# Patient Record
Sex: MALE | Race: ASIAN | Employment: FULL TIME | URBAN - NONMETROPOLITAN AREA
[De-identification: names, ages, dates, MRNs, and addresses within clinical notes are randomized per-mention and may not be internally consistent; named-entity substitution may affect disease eponyms.]

---

## 2020-08-29 ENCOUNTER — HOSPITAL ENCOUNTER (EMERGENCY)
Age: 50
Discharge: HOME | End: 2020-08-29
Payer: COMMERCIAL

## 2020-08-29 ENCOUNTER — APPOINTMENT (OUTPATIENT)
Dept: CT IMAGING | Age: 50
End: 2020-08-29
Payer: COMMERCIAL

## 2020-08-29 VITALS
BODY MASS INDEX: 25.34 KG/M2 | OXYGEN SATURATION: 98 % | WEIGHT: 143 LBS | RESPIRATION RATE: 20 BRPM | TEMPERATURE: 98 F | SYSTOLIC BLOOD PRESSURE: 148 MMHG | HEART RATE: 78 BPM | DIASTOLIC BLOOD PRESSURE: 78 MMHG | HEIGHT: 63 IN

## 2020-08-29 LAB
ALBUMIN SERPL-MCNC: 4.5 G/DL (ref 3.5–5.2)
ALP BLD-CCNC: 59 U/L (ref 40–130)
ALT SERPL-CCNC: 32 U/L (ref 5–41)
ANION GAP SERPL CALCULATED.3IONS-SCNC: 9 MMOL/L (ref 7–19)
AST SERPL-CCNC: 24 U/L (ref 5–40)
BASOPHILS ABSOLUTE: 0.1 K/UL (ref 0–0.2)
BASOPHILS RELATIVE PERCENT: 1 % (ref 0–1)
BILIRUB SERPL-MCNC: <0.2 MG/DL (ref 0.2–1.2)
BUN BLDV-MCNC: 14 MG/DL (ref 6–20)
CALCIUM SERPL-MCNC: 8.9 MG/DL (ref 8.6–10)
CHLORIDE BLD-SCNC: 105 MMOL/L (ref 98–111)
CHP ED QC CHECK: NORMAL
CO2: 27 MMOL/L (ref 22–29)
CREAT SERPL-MCNC: 0.8 MG/DL (ref 0.5–1.2)
EOSINOPHILS ABSOLUTE: 0.5 K/UL (ref 0–0.6)
EOSINOPHILS RELATIVE PERCENT: 5.2 % (ref 0–5)
GFR AFRICAN AMERICAN: >59
GFR NON-AFRICAN AMERICAN: >60
GLUCOSE BLD-MCNC: 65 MG/DL (ref 70–99)
GLUCOSE BLD-MCNC: 99 MG/DL (ref 74–109)
HCT VFR BLD CALC: 46.3 % (ref 42–52)
HEMOGLOBIN: 15.3 G/DL (ref 14–18)
IMMATURE GRANULOCYTES #: 0.1 K/UL
LYMPHOCYTES ABSOLUTE: 3.3 K/UL (ref 1.1–4.5)
LYMPHOCYTES RELATIVE PERCENT: 34.5 % (ref 20–40)
MCH RBC QN AUTO: 30.7 PG (ref 27–31)
MCHC RBC AUTO-ENTMCNC: 33 G/DL (ref 33–37)
MCV RBC AUTO: 92.8 FL (ref 80–94)
MONOCYTES ABSOLUTE: 0.9 K/UL (ref 0–0.9)
MONOCYTES RELATIVE PERCENT: 9.5 % (ref 0–10)
NEUTROPHILS ABSOLUTE: 4.6 K/UL (ref 1.5–7.5)
NEUTROPHILS RELATIVE PERCENT: 48.9 % (ref 50–65)
PDW BLD-RTO: 12.1 % (ref 11.5–14.5)
PERFORMED ON: ABNORMAL
PLATELET # BLD: 212 K/UL (ref 130–400)
PMV BLD AUTO: 11.4 FL (ref 9.4–12.4)
POTASSIUM SERPL-SCNC: 4.3 MMOL/L (ref 3.5–5)
RBC # BLD: 4.99 M/UL (ref 4.7–6.1)
SODIUM BLD-SCNC: 141 MMOL/L (ref 136–145)
TOTAL PROTEIN: 7.2 G/DL (ref 6.6–8.7)
WBC # BLD: 9.4 K/UL (ref 4.8–10.8)

## 2020-08-29 PROCEDURE — 82947 ASSAY GLUCOSE BLOOD QUANT: CPT

## 2020-08-29 PROCEDURE — 2580000003 HC RX 258: Performed by: NURSE PRACTITIONER

## 2020-08-29 PROCEDURE — 70450 CT HEAD/BRAIN W/O DYE: CPT

## 2020-08-29 PROCEDURE — 80053 COMPREHEN METABOLIC PANEL: CPT

## 2020-08-29 PROCEDURE — 99999 PR OFFICE/OUTPT VISIT,PROCEDURE ONLY: CPT | Performed by: NURSE PRACTITIONER

## 2020-08-29 PROCEDURE — 93005 ELECTROCARDIOGRAM TRACING: CPT | Performed by: NURSE PRACTITIONER

## 2020-08-29 PROCEDURE — 85025 COMPLETE CBC W/AUTO DIFF WBC: CPT

## 2020-08-29 PROCEDURE — 99284 EMERGENCY DEPT VISIT MOD MDM: CPT

## 2020-08-29 PROCEDURE — 36415 COLL VENOUS BLD VENIPUNCTURE: CPT

## 2020-08-29 RX ORDER — LOSARTAN POTASSIUM 25 MG/1
25 TABLET ORAL DAILY
COMMUNITY

## 2020-08-29 RX ORDER — GEMFIBROZIL 600 MG/1
600 TABLET, FILM COATED ORAL
COMMUNITY

## 2020-08-29 RX ORDER — ROSUVASTATIN CALCIUM 10 MG/1
10 TABLET, COATED ORAL DAILY
COMMUNITY

## 2020-08-29 RX ORDER — SODIUM CHLORIDE, SODIUM LACTATE, POTASSIUM CHLORIDE, CALCIUM CHLORIDE 600; 310; 30; 20 MG/100ML; MG/100ML; MG/100ML; MG/100ML
1000 INJECTION, SOLUTION INTRAVENOUS ONCE
Status: COMPLETED | OUTPATIENT
Start: 2020-08-29 | End: 2020-08-29

## 2020-08-29 RX ADMIN — SODIUM CHLORIDE, POTASSIUM CHLORIDE, SODIUM LACTATE AND CALCIUM CHLORIDE 1000 ML: 600; 310; 30; 20 INJECTION, SOLUTION INTRAVENOUS at 19:18

## 2020-08-29 ASSESSMENT — ENCOUNTER SYMPTOMS
RHINORRHEA: 0
VOMITING: 0
ABDOMINAL PAIN: 0
TROUBLE SWALLOWING: 0
CONSTIPATION: 0
DIARRHEA: 0
COUGH: 0
NAUSEA: 0
SHORTNESS OF BREATH: 0
SORE THROAT: 0

## 2020-08-29 NOTE — ED NOTES
Patient placed in a gown. Patient placed on cardiac monitor, continuous pulse oximeter, and NIBP monitor.  Monitor alarms on.       Nia Medina RN  08/29/20 0199

## 2020-08-30 NOTE — ED PROVIDER NOTES
nervous/anxious. A complete review of systems was performed and is negative except as noted above in the HPI. PAST MEDICAL HISTORY     Past Medical History:   Diagnosis Date    CAD (coronary artery disease)     Hyperlipidemia     Hypertension          SURGICAL HISTORY       Past Surgical History:   Procedure Laterality Date    CORONARY ARTERY BYPASS GRAFT           CURRENT MEDICATIONS       Previous Medications    GEMFIBROZIL (LOPID) 600 MG TABLET    Take 600 mg by mouth 2 times daily (before meals)    LOSARTAN (COZAAR) 25 MG TABLET    Take 25 mg by mouth daily    ROSUVASTATIN (CRESTOR) 10 MG TABLET    Take 10 mg by mouth daily       ALLERGIES     Plavix [clopidogrel bisulfate]    FAMILY HISTORY     History reviewed. No pertinent family history.        SOCIAL HISTORY       Social History     Socioeconomic History    Marital status:      Spouse name: None    Number of children: None    Years of education: None    Highest education level: None   Occupational History    None   Social Needs    Financial resource strain: None    Food insecurity     Worry: None     Inability: None    Transportation needs     Medical: None     Non-medical: None   Tobacco Use    Smoking status: Current Some Day Smoker    Smokeless tobacco: Never Used   Substance and Sexual Activity    Alcohol use: Not Currently    Drug use: None    Sexual activity: None   Lifestyle    Physical activity     Days per week: None     Minutes per session: None    Stress: None   Relationships    Social connections     Talks on phone: None     Gets together: None     Attends Gnosticism service: None     Active member of club or organization: None     Attends meetings of clubs or organizations: None     Relationship status: None    Intimate partner violence     Fear of current or ex partner: None     Emotionally abused: None     Physically abused: None     Forced sexual activity: None   Other Topics Concern    None   Social Cranial Nerves: Cranial nerves are intact. Sensory: Sensation is intact. Motor: Motor function is intact. Coordination: Coordination normal.      Gait: Gait is intact. Psychiatric:         Mood and Affect: Mood normal.         Speech: Speech normal.         Behavior: Behavior normal.         Thought Content: Thought content normal.         Judgment: Judgment normal.         DIAGNOSTIC RESULTS     EKG: All EKG's are interpreted by the Emergency Department Physician who either signs or Co-signs this chart in the absence of a cardiologist.    Conception Dubin -sinus rhythm without ectopy, ST or T wave changes. Rate of 59    RADIOLOGY:   Non-plain film images such as CT, Ultrasound and MRI are read by the radiologist. Cayla Trinidad images are visualized and preliminarily interpreted by the emergency physician with the below findings:        Interpretation per the Radiologist below, if available at the time of this note:    CT HEAD WO CONTRAST   Final Result   Impression:   No acute intracranial findings. Signed by Dr Odilia Jarquin on 8/29/2020 8:32 PM            ED BEDSIDE ULTRASOUND:   Performed by ED Physician - none    LABS:  Labs Reviewed   CBC WITH AUTO DIFFERENTIAL - Abnormal; Notable for the following components:       Result Value    Neutrophils % 48.9 (*)     Eosinophils % 5.2 (*)     All other components within normal limits   POCT GLUCOSE - Abnormal; Notable for the following components:    POC Glucose 65 (*)     All other components within normal limits   POCT GLUCOSE - Normal   COMPREHENSIVE METABOLIC PANEL       All other labs were within normal range or not returned as of this dictation.     EMERGENCY DEPARTMENT COURSE and DIFFERENTIALDIAGNOSIS/MDM:   Vitals:    Vitals:    08/29/20 1829 08/29/20 1950 08/29/20 2024   BP: (!) 159/94 (!) 151/78 (!) 146/78   Pulse: 75 78 76   Resp: 14 18 18   Temp: 98.1 °F (36.7 °C)     SpO2: 98% 98% 98%   Weight: 143 lb (64.9 kg)     Height: 5' 3\" (1.6 m) MDM  Number of Diagnoses or Management Options  Dizziness:   Diagnosis management comments: Neuro exam is negative. He is feeling better. I ruled out stroke, mass with CT. He may need MRI if his visual complaints and dizziness continues to r/o MS but now his symptoms have resolved. Going to have him follow-up with his primary care provider and neurology. Amount and/or Complexity of Data Reviewed  Clinical lab tests: ordered and reviewed  Tests in the radiology section of CPT®: ordered and reviewed    Patient Progress  Patient progress: improved        CONSULTS:  None    PROCEDURES:  Unless otherwise notedbelow, none     Procedures    FINAL IMPRESSION     1.  Dizziness          DISPOSITION/PLAN   DISPOSITION        PATIENT REFERRED TO:  @FUP@    DISCHARGE MEDICATIONS:  New Prescriptions    No medications on file          (Please note that portions of this note were completed with a voice recognition program.  Efforts were made to edit the dictations butoccasionally words are mis-transcribed.)    COURTNEY Rees (electronically signed)  AttendingEmergency Physician         COURTNEY Rees  08/29/20 6027

## 2020-08-31 LAB
EKG P AXIS: 9 DEGREES
EKG P-R INTERVAL: 152 MS
EKG Q-T INTERVAL: 416 MS
EKG QRS DURATION: 100 MS
EKG QTC CALCULATION (BAZETT): 413 MS
EKG T AXIS: 67 DEGREES

## 2020-08-31 PROCEDURE — 93010 ELECTROCARDIOGRAM REPORT: CPT | Performed by: INTERNAL MEDICINE

## 2020-09-01 ENCOUNTER — TELEPHONE (OUTPATIENT)
Dept: NEUROLOGY | Age: 50
End: 2020-09-01

## 2020-09-01 NOTE — TELEPHONE ENCOUNTER
Called and spoke with patient to let him know that I have him scheduled an appointment with Dr. Lui Noguera for 09-02-20 at 10:30. Patient is aware of the appointment time/date/location.

## 2020-09-01 NOTE — TELEPHONE ENCOUNTER
----- Message from Solmon Mcburney, MD sent at 9/1/2020  8:04 AM CDT -----  It would appear that this is a new patient referred from the ER. How about 1030 tomorrow morning?  ----- Message -----  From: Rachel Paul  Sent: 8/31/2020   1:44 PM CDT  To: Solmon Mcburney, MD    Patient is needing to make a HFU with you. Can you let me know where you would like for this patient to be added on?  Thanks

## 2020-10-05 ENCOUNTER — TELEPHONE (OUTPATIENT)
Dept: NEUROLOGY | Age: 50
End: 2020-10-05

## 2020-10-05 ENCOUNTER — OFFICE VISIT (OUTPATIENT)
Dept: NEUROLOGY | Age: 50
End: 2020-10-05
Payer: COMMERCIAL

## 2020-10-05 VITALS
SYSTOLIC BLOOD PRESSURE: 112 MMHG | HEIGHT: 63 IN | BODY MASS INDEX: 25.69 KG/M2 | WEIGHT: 145 LBS | HEART RATE: 73 BPM | DIASTOLIC BLOOD PRESSURE: 78 MMHG

## 2020-10-05 PROCEDURE — 1111F DSCHRG MED/CURRENT MED MERGE: CPT | Performed by: PSYCHIATRY & NEUROLOGY

## 2020-10-05 PROCEDURE — 99204 OFFICE O/P NEW MOD 45 MIN: CPT | Performed by: PSYCHIATRY & NEUROLOGY

## 2020-10-05 NOTE — PROGRESS NOTES

## 2020-10-05 NOTE — PROGRESS NOTES
Chief Complaint   Patient presents with    Follow-Up from Hospital     Patient here for hfu after recent evaluation in the ED for dizziness. Patient instructed to f/u with our office. Diamond Vallecillo is a 47y.o. year old male who is seen for evaluation of left facial weakness. He says that he came into the ED with this on 8/29/2020. Those records report that he came in complaining of dizziness and visual disturbance. He denies that and says that he came in because of weakness of the left side of his face. It involved the upper part of the face and he was unable to fully close his eye or raise his eyebrow. Symptoms went away after about a week. There is no change in hearing or taste nor any pain associated with it. His hands go numb on him at times intermittently but nothing unilaterally. He is originally from [facility].  Apparently has a history of coronary artery disease along with hypertension and hyperlipidemia. Active Ambulatory Problems     Diagnosis Date Noted    No Active Ambulatory Problems     Resolved Ambulatory Problems     Diagnosis Date Noted    No Resolved Ambulatory Problems     Past Medical History:   Diagnosis Date    CAD (coronary artery disease)     Hyperlipidemia     Hypertension        Past Surgical History:   Procedure Laterality Date    CORONARY ARTERY BYPASS GRAFT         History reviewed. No pertinent family history.     Allergies   Allergen Reactions    Plavix [Clopidogrel Bisulfate]        Social History     Socioeconomic History    Marital status:      Spouse name: Not on file    Number of children: Not on file    Years of education: Not on file    Highest education level: Not on file   Occupational History    Not on file   Social Needs    Financial resource strain: Not on file    Food insecurity     Worry: Not on file     Inability: Not on file    Transportation needs     Medical: Not on file     Non-medical: Not on file   Tobacco Use    Smoking status: Current Some Day Smoker    Smokeless tobacco: Never Used   Substance and Sexual Activity    Alcohol use: Not Currently    Drug use: Not on file    Sexual activity: Not on file   Lifestyle    Physical activity     Days per week: Not on file     Minutes per session: Not on file    Stress: Not on file   Relationships    Social connections     Talks on phone: Not on file     Gets together: Not on file     Attends Mosque service: Not on file     Active member of club or organization: Not on file     Attends meetings of clubs or organizations: Not on file     Relationship status: Not on file    Intimate partner violence     Fear of current or ex partner: Not on file     Emotionally abused: Not on file     Physically abused: Not on file     Forced sexual activity: Not on file   Other Topics Concern    Not on file   Social History Narrative    Not on file       Review of Systems    Constitutional: ?Fever ? Sweats ? Chills ? Recent Injury ? Denies all unless marked   HENT:?Headache ? Head Injury ? Sore Throat ? Ear Pain ? Dizziness ? Hearing Loss ? Denies all unless marked   Spine: ? Neck pain ? Back pain ? Sciaticia ? Denies all unless marked   Cardiovascular:?Chest Pain ? Palpitations ? Heart Disease ? Denies all unless marked   Pulmonary: ? Shortness of Breath ? Cough ? Denies all unless marked   Gastrointestinal: ?Abdominal Pain ? Blood in Stool ? Diarrhea ? Constipation ? Nausea ? Vomiting ? Denies all unless marked   Genitourinary: ? Dysuria ? Frequency ? Incontinence ? Urgency ? Denies all unless marked   Musculoskeletal: ? Arthralgia ? Myalgias ? Muscle cramps ? Muscle twitches   ? Denies all unless marked   Extremities: ? Pain ? Swelling ? Denies all unless marked   Skin:? Rash ? Color Change ? Denies all unless marked   Neurological:? Visual Disturbance ? Double Vision ? Slurred Speech ? Trouble swallowing ? Vertigo ? Tingling ? Numbness ? Weakness ? Loss of Balance   ? Loss of Consciousness ?  Memory vascular risk factors, however. It is unclear to me why the ED note makes no mention of facial weakness. Plan  I have ordered an MRI and if it is not too expensive I would like him to follow through with that given his vascular risk factors. If it is too expensive we will just assume that it was a Bell's palsy given his clinical symptomatology. I remain available as needed. Return if symptoms worsen or fail to improve.     (Please note that portions of this note were completed with a voice recognition program. Efforts were made to edit the dictations but occasionally words are mis-transcribed.)

## 2024-01-10 NOTE — TELEPHONE ENCOUNTER
Called patient abut his MRI appointment, we spoke about the MRI appointment patient asking me the cost of MRI  Tried to give patient billing number, patient did not want number. elsa jenkins [email or web address]

## 2024-06-30 ENCOUNTER — APPOINTMENT (OUTPATIENT)
Dept: ULTRASOUND IMAGING | Facility: HOSPITAL | Age: 54
DRG: 271 | End: 2024-06-30
Payer: COMMERCIAL

## 2024-06-30 ENCOUNTER — APPOINTMENT (OUTPATIENT)
Dept: CT IMAGING | Facility: HOSPITAL | Age: 54
DRG: 271 | End: 2024-06-30
Payer: COMMERCIAL

## 2024-06-30 ENCOUNTER — APPOINTMENT (OUTPATIENT)
Dept: GENERAL RADIOLOGY | Facility: HOSPITAL | Age: 54
DRG: 271 | End: 2024-06-30
Payer: COMMERCIAL

## 2024-06-30 ENCOUNTER — APPOINTMENT (OUTPATIENT)
Dept: CARDIOLOGY | Facility: HOSPITAL | Age: 54
DRG: 271 | End: 2024-06-30
Payer: COMMERCIAL

## 2024-06-30 ENCOUNTER — HOSPITAL ENCOUNTER (INPATIENT)
Facility: HOSPITAL | Age: 54
LOS: 1 days | Discharge: ANOTHER HOSPITAL | DRG: 271 | End: 2024-06-30
Attending: STUDENT IN AN ORGANIZED HEALTH CARE EDUCATION/TRAINING PROGRAM | Admitting: INTERNAL MEDICINE
Payer: COMMERCIAL

## 2024-06-30 VITALS
HEART RATE: 63 BPM | WEIGHT: 138.45 LBS | OXYGEN SATURATION: 98 % | DIASTOLIC BLOOD PRESSURE: 79 MMHG | SYSTOLIC BLOOD PRESSURE: 123 MMHG | RESPIRATION RATE: 18 BRPM | BODY MASS INDEX: 24.53 KG/M2 | HEIGHT: 63 IN | TEMPERATURE: 97.5 F

## 2024-06-30 DIAGNOSIS — I21.3 ST ELEVATION MYOCARDIAL INFARCTION (STEMI), UNSPECIFIED ARTERY: Primary | ICD-10-CM

## 2024-06-30 PROBLEM — E78.5 HYPERLIPIDEMIA LDL GOAL <70: Status: ACTIVE | Noted: 2024-06-30

## 2024-06-30 PROBLEM — Z72.0 TOBACCO ABUSE: Status: ACTIVE | Noted: 2024-06-30

## 2024-06-30 PROBLEM — I10 PRIMARY HYPERTENSION: Status: ACTIVE | Noted: 2024-06-30

## 2024-06-30 PROBLEM — I21.09 ACUTE ST ELEVATION MYOCARDIAL INFARCTION (STEMI) OF ANTERIOR WALL: Status: ACTIVE | Noted: 2024-06-30

## 2024-06-30 LAB
ABO GROUP BLD: NORMAL
ANION GAP SERPL CALCULATED.3IONS-SCNC: 10 MMOL/L (ref 5–15)
ANTI-E: NORMAL
ANTI-FYB: NORMAL
APTT PPP: 118.5 SECONDS (ref 24.5–36)
APTT PPP: >200 SECONDS (ref 24.5–36)
APTT PPP: >200 SECONDS (ref 24.5–36)
AT III PPP CHRO-ACNC: 82 % (ref 84–123)
BASOPHILS # BLD AUTO: 0.05 10*3/MM3 (ref 0–0.2)
BASOPHILS NFR BLD AUTO: 0.5 % (ref 0–1.5)
BH CV ECHO MEAS - AO MAX PG: 5.9 MMHG
BH CV ECHO MEAS - AO MEAN PG: 3 MMHG
BH CV ECHO MEAS - AO V2 MAX: 121 CM/SEC
BH CV ECHO MEAS - AO V2 VTI: 24.1 CM
BH CV ECHO MEAS - AVA(I,D): 2.2 CM2
BH CV ECHO MEAS - EDV(CUBED): 97.3 ML
BH CV ECHO MEAS - EDV(MOD-SP2): 60.1 ML
BH CV ECHO MEAS - EDV(MOD-SP4): 66.2 ML
BH CV ECHO MEAS - EF(MOD-SP2): 55.9 %
BH CV ECHO MEAS - EF(MOD-SP4): 52.7 %
BH CV ECHO MEAS - ESV(CUBED): 39.3 ML
BH CV ECHO MEAS - ESV(MOD-SP2): 26.5 ML
BH CV ECHO MEAS - ESV(MOD-SP4): 31.3 ML
BH CV ECHO MEAS - FS: 26.1 %
BH CV ECHO MEAS - IVS/LVPW: 0.83 CM
BH CV ECHO MEAS - IVSD: 0.85 CM
BH CV ECHO MEAS - LA DIMENSION: 3.4 CM
BH CV ECHO MEAS - LAT PEAK E' VEL: 9.9 CM/SEC
BH CV ECHO MEAS - LV DIASTOLIC VOL/BSA (35-75): 40.1 CM2
BH CV ECHO MEAS - LV MASS(C)D: 146.1 GRAMS
BH CV ECHO MEAS - LV MAX PG: 3.8 MMHG
BH CV ECHO MEAS - LV MEAN PG: 2 MMHG
BH CV ECHO MEAS - LV SYSTOLIC VOL/BSA (12-30): 19 CM2
BH CV ECHO MEAS - LV V1 MAX: 97.7 CM/SEC
BH CV ECHO MEAS - LV V1 VTI: 16.9 CM
BH CV ECHO MEAS - LVIDD: 4.6 CM
BH CV ECHO MEAS - LVIDS: 3.4 CM
BH CV ECHO MEAS - LVOT AREA: 3.1 CM2
BH CV ECHO MEAS - LVOT DIAM: 2 CM
BH CV ECHO MEAS - LVPWD: 1.03 CM
BH CV ECHO MEAS - MED PEAK E' VEL: 5.5 CM/SEC
BH CV ECHO MEAS - MR MAX PG: 38.6 MMHG
BH CV ECHO MEAS - MR MAX VEL: 310.5 CM/SEC
BH CV ECHO MEAS - MV A MAX VEL: 71.1 CM/SEC
BH CV ECHO MEAS - MV DEC SLOPE: 250 CM/SEC2
BH CV ECHO MEAS - MV E MAX VEL: 76.7 CM/SEC
BH CV ECHO MEAS - MV E/A: 1.08
BH CV ECHO MEAS - MV P1/2T: 73.1 MSEC
BH CV ECHO MEAS - MVA(P1/2T): 3 CM2
BH CV ECHO MEAS - PA V2 MAX: 69.8 CM/SEC
BH CV ECHO MEAS - RAP SYSTOLE: 3 MMHG
BH CV ECHO MEAS - RV MAX PG: 1.23 MMHG
BH CV ECHO MEAS - RV V1 MAX: 55.5 CM/SEC
BH CV ECHO MEAS - RVDD: 2.7 CM
BH CV ECHO MEAS - RVSP: 23.6 MMHG
BH CV ECHO MEAS - SV(LVOT): 53.1 ML
BH CV ECHO MEAS - SV(MOD-SP2): 33.6 ML
BH CV ECHO MEAS - SV(MOD-SP4): 34.9 ML
BH CV ECHO MEAS - SVI(LVOT): 32.1 ML/M2
BH CV ECHO MEAS - SVI(MOD-SP2): 20.3 ML/M2
BH CV ECHO MEAS - SVI(MOD-SP4): 21.1 ML/M2
BH CV ECHO MEAS - TAPSE (>1.6): 1.26 CM
BH CV ECHO MEAS - TR MAX PG: 20.6 MMHG
BH CV ECHO MEAS - TR MAX VEL: 227 CM/SEC
BH CV ECHO MEASUREMENTS AVERAGE E/E' RATIO: 9.96
BH CV XLRA - RV BASE: 2.8 CM
BLD GP AB SCN SERPL QL: POSITIVE
BUN SERPL-MCNC: 17 MG/DL (ref 6–20)
BUN/CREAT SERPL: 19.8 (ref 7–25)
CALCIUM SPEC-SCNC: 8.5 MG/DL (ref 8.6–10.5)
CHLORIDE SERPL-SCNC: 105 MMOL/L (ref 98–107)
CHOLEST SERPL-MCNC: 242 MG/DL (ref 0–200)
CO2 SERPL-SCNC: 27 MMOL/L (ref 22–29)
CREAT SERPL-MCNC: 0.86 MG/DL (ref 0.76–1.27)
DEPRECATED RDW RBC AUTO: 43.2 FL (ref 37–54)
DUFFY B ANTIGEN: NEGATIVE
E AG RBC QL: NEGATIVE
EGFRCR SERPLBLD CKD-EPI 2021: 102.9 ML/MIN/1.73
EOSINOPHIL # BLD AUTO: 0.1 10*3/MM3 (ref 0–0.4)
EOSINOPHIL NFR BLD AUTO: 0.9 % (ref 0.3–6.2)
ERYTHROCYTE [DISTWIDTH] IN BLOOD BY AUTOMATED COUNT: 12.6 % (ref 12.3–15.4)
FIBRINOGEN PPP-MCNC: 399 MG/DL (ref 240–460)
GEN 5 2HR TROPONIN T REFLEX: 2333 NG/L
GLUCOSE BLDC GLUCOMTR-MCNC: 114 MG/DL (ref 70–130)
GLUCOSE SERPL-MCNC: 128 MG/DL (ref 65–99)
HCT VFR BLD AUTO: 47.8 % (ref 37.5–51)
HDLC SERPL-MCNC: 45 MG/DL (ref 40–60)
HGB BLD-MCNC: 15.9 G/DL (ref 13–17.7)
HOLD SPECIMEN: NORMAL
HOLD SPECIMEN: NORMAL
IMM GRANULOCYTES # BLD AUTO: 0.06 10*3/MM3 (ref 0–0.05)
IMM GRANULOCYTES NFR BLD AUTO: 0.6 % (ref 0–0.5)
INR PPP: 0.91 (ref 0.91–1.09)
INR PPP: 1 (ref 0.91–1.09)
LDLC SERPL CALC-MCNC: 177 MG/DL (ref 0–100)
LDLC/HDLC SERPL: 3.88 {RATIO}
LEFT ATRIUM VOLUME INDEX: 17.8 ML/M2
LEFT ATRIUM VOLUME: 29.4 ML
LYMPHOCYTES # BLD AUTO: 2.73 10*3/MM3 (ref 0.7–3.1)
LYMPHOCYTES NFR BLD AUTO: 25.5 % (ref 19.6–45.3)
MCH RBC QN AUTO: 30.8 PG (ref 26.6–33)
MCHC RBC AUTO-ENTMCNC: 33.3 G/DL (ref 31.5–35.7)
MCV RBC AUTO: 92.6 FL (ref 79–97)
MONOCYTES # BLD AUTO: 0.56 10*3/MM3 (ref 0.1–0.9)
MONOCYTES NFR BLD AUTO: 5.2 % (ref 5–12)
NEUTROPHILS NFR BLD AUTO: 67.3 % (ref 42.7–76)
NEUTROPHILS NFR BLD AUTO: 7.2 10*3/MM3 (ref 1.7–7)
NRBC BLD AUTO-RTO: 0 /100 WBC (ref 0–0.2)
PLATELET # BLD AUTO: 195 10*3/MM3 (ref 140–450)
PMV BLD AUTO: 10.1 FL (ref 6–12)
POTASSIUM SERPL-SCNC: 4 MMOL/L (ref 3.5–5.2)
PROTHROMBIN TIME: 12.6 SECONDS (ref 11.8–14.8)
PROTHROMBIN TIME: 13.6 SECONDS (ref 11.8–14.8)
RBC # BLD AUTO: 5.16 10*6/MM3 (ref 4.14–5.8)
RH BLD: POSITIVE
SINUS: 2.7 CM
SODIUM SERPL-SCNC: 142 MMOL/L (ref 136–145)
STJ: 2.6 CM
T&S EXPIRATION DATE: NORMAL
TRIGL SERPL-MCNC: 113 MG/DL (ref 0–150)
TROPONIN T DELTA: 2033 NG/L
TROPONIN T SERPL HS-MCNC: 300 NG/L
VLDLC SERPL-MCNC: 20 MG/DL (ref 5–40)
WBC NRBC COR # BLD AUTO: 10.7 10*3/MM3 (ref 3.4–10.8)
WHOLE BLOOD HOLD COAG: NORMAL
WHOLE BLOOD HOLD SPECIMEN: NORMAL

## 2024-06-30 PROCEDURE — 93306 TTE W/DOPPLER COMPLETE: CPT

## 2024-06-30 PROCEDURE — 99152 MOD SED SAME PHYS/QHP 5/>YRS: CPT | Performed by: INTERNAL MEDICINE

## 2024-06-30 PROCEDURE — P9035 PLATELET PHERES LEUKOREDUCED: HCPCS

## 2024-06-30 PROCEDURE — 4A023N7 MEASUREMENT OF CARDIAC SAMPLING AND PRESSURE, LEFT HEART, PERCUTANEOUS APPROACH: ICD-10-PCS | Performed by: INTERNAL MEDICINE

## 2024-06-30 PROCEDURE — 80061 LIPID PANEL: CPT | Performed by: STUDENT IN AN ORGANIZED HEALTH CARE EDUCATION/TRAINING PROGRAM

## 2024-06-30 PROCEDURE — 86902 BLOOD TYPE ANTIGEN DONOR EA: CPT

## 2024-06-30 PROCEDURE — B3101ZZ FLUOROSCOPY OF THORACIC AORTA USING LOW OSMOLAR CONTRAST: ICD-10-PCS | Performed by: INTERNAL MEDICINE

## 2024-06-30 PROCEDURE — 25510000001 IOPAMIDOL PER 1 ML: Performed by: STUDENT IN AN ORGANIZED HEALTH CARE EDUCATION/TRAINING PROGRAM

## 2024-06-30 PROCEDURE — 84484 ASSAY OF TROPONIN QUANT: CPT | Performed by: STUDENT IN AN ORGANIZED HEALTH CARE EDUCATION/TRAINING PROGRAM

## 2024-06-30 PROCEDURE — C1894 INTRO/SHEATH, NON-LASER: HCPCS | Performed by: INTERNAL MEDICINE

## 2024-06-30 PROCEDURE — 99255 IP/OBS CONSLTJ NEW/EST HI 80: CPT | Performed by: SURGERY

## 2024-06-30 PROCEDURE — 86870 RBC ANTIBODY IDENTIFICATION: CPT | Performed by: INTERNAL MEDICINE

## 2024-06-30 PROCEDURE — 85730 THROMBOPLASTIN TIME PARTIAL: CPT | Performed by: INTERNAL MEDICINE

## 2024-06-30 PROCEDURE — 86905 BLOOD TYPING RBC ANTIGENS: CPT | Performed by: INTERNAL MEDICINE

## 2024-06-30 PROCEDURE — 71275 CT ANGIOGRAPHY CHEST: CPT

## 2024-06-30 PROCEDURE — 25010000002 FENTANYL CITRATE (PF) 50 MCG/ML SOLUTION: Performed by: INTERNAL MEDICINE

## 2024-06-30 PROCEDURE — 85025 COMPLETE CBC W/AUTO DIFF WBC: CPT | Performed by: STUDENT IN AN ORGANIZED HEALTH CARE EDUCATION/TRAINING PROGRAM

## 2024-06-30 PROCEDURE — 33967 INSERT I-AORT PERCUT DEVICE: CPT | Performed by: INTERNAL MEDICINE

## 2024-06-30 PROCEDURE — C1769 GUIDE WIRE: HCPCS | Performed by: INTERNAL MEDICINE

## 2024-06-30 PROCEDURE — 93005 ELECTROCARDIOGRAM TRACING: CPT | Performed by: STUDENT IN AN ORGANIZED HEALTH CARE EDUCATION/TRAINING PROGRAM

## 2024-06-30 PROCEDURE — 93459 L HRT ART/GRFT ANGIO: CPT | Performed by: INTERNAL MEDICINE

## 2024-06-30 PROCEDURE — 85300 ANTITHROMBIN III ACTIVITY: CPT | Performed by: INTERNAL MEDICINE

## 2024-06-30 PROCEDURE — 93356 MYOCRD STRAIN IMG SPCKL TRCK: CPT

## 2024-06-30 PROCEDURE — 93970 EXTREMITY STUDY: CPT

## 2024-06-30 PROCEDURE — B2151ZZ FLUOROSCOPY OF LEFT HEART USING LOW OSMOLAR CONTRAST: ICD-10-PCS | Performed by: INTERNAL MEDICINE

## 2024-06-30 PROCEDURE — 71045 X-RAY EXAM CHEST 1 VIEW: CPT

## 2024-06-30 PROCEDURE — 82948 REAGENT STRIP/BLOOD GLUCOSE: CPT

## 2024-06-30 PROCEDURE — 85610 PROTHROMBIN TIME: CPT | Performed by: INTERNAL MEDICINE

## 2024-06-30 PROCEDURE — 80048 BASIC METABOLIC PNL TOTAL CA: CPT | Performed by: STUDENT IN AN ORGANIZED HEALTH CARE EDUCATION/TRAINING PROGRAM

## 2024-06-30 PROCEDURE — 25010000002 HEPARIN (PORCINE) PER 1000 UNITS: Performed by: INTERNAL MEDICINE

## 2024-06-30 PROCEDURE — 93880 EXTRACRANIAL BILAT STUDY: CPT

## 2024-06-30 PROCEDURE — 25510000001 IOPAMIDOL PER 1 ML: Performed by: INTERNAL MEDICINE

## 2024-06-30 PROCEDURE — 5A02210 ASSISTANCE WITH CARDIAC OUTPUT USING BALLOON PUMP, CONTINUOUS: ICD-10-PCS | Performed by: INTERNAL MEDICINE

## 2024-06-30 PROCEDURE — 85610 PROTHROMBIN TIME: CPT | Performed by: STUDENT IN AN ORGANIZED HEALTH CARE EDUCATION/TRAINING PROGRAM

## 2024-06-30 PROCEDURE — B2111ZZ FLUOROSCOPY OF MULTIPLE CORONARY ARTERIES USING LOW OSMOLAR CONTRAST: ICD-10-PCS | Performed by: INTERNAL MEDICINE

## 2024-06-30 PROCEDURE — 93356 MYOCRD STRAIN IMG SPCKL TRCK: CPT | Performed by: INTERNAL MEDICINE

## 2024-06-30 PROCEDURE — 93010 ELECTROCARDIOGRAM REPORT: CPT | Performed by: INTERNAL MEDICINE

## 2024-06-30 PROCEDURE — 99236 HOSP IP/OBS SAME DATE HI 85: CPT | Performed by: INTERNAL MEDICINE

## 2024-06-30 PROCEDURE — L1830 KO IMMOB CANVAS LONG PRE OTS: HCPCS | Performed by: INTERNAL MEDICINE

## 2024-06-30 PROCEDURE — 36430 TRANSFUSION BLD/BLD COMPNT: CPT

## 2024-06-30 PROCEDURE — 25010000002 DIPHENHYDRAMINE PER 50 MG: Performed by: INTERNAL MEDICINE

## 2024-06-30 PROCEDURE — 85347 COAGULATION TIME ACTIVATED: CPT

## 2024-06-30 PROCEDURE — 86900 BLOOD TYPING SEROLOGIC ABO: CPT | Performed by: INTERNAL MEDICINE

## 2024-06-30 PROCEDURE — 85384 FIBRINOGEN ACTIVITY: CPT | Performed by: INTERNAL MEDICINE

## 2024-06-30 PROCEDURE — 93970 EXTREMITY STUDY: CPT | Performed by: SURGERY

## 2024-06-30 PROCEDURE — C1887 CATHETER, GUIDING: HCPCS | Performed by: INTERNAL MEDICINE

## 2024-06-30 PROCEDURE — 86850 RBC ANTIBODY SCREEN: CPT | Performed by: INTERNAL MEDICINE

## 2024-06-30 PROCEDURE — 36415 COLL VENOUS BLD VENIPUNCTURE: CPT

## 2024-06-30 PROCEDURE — 92941 PRQ TRLML REVSC TOT OCCL AMI: CPT | Performed by: INTERNAL MEDICINE

## 2024-06-30 PROCEDURE — 86922 COMPATIBILITY TEST ANTIGLOB: CPT

## 2024-06-30 PROCEDURE — 25010000002 MIDAZOLAM HCL (PF) 5 MG/5ML SOLUTION: Performed by: INTERNAL MEDICINE

## 2024-06-30 PROCEDURE — 25010000002 HEPARIN (PORCINE) 2000-0.9 UNIT/L-% SOLUTION: Performed by: INTERNAL MEDICINE

## 2024-06-30 PROCEDURE — 93306 TTE W/DOPPLER COMPLETE: CPT | Performed by: INTERNAL MEDICINE

## 2024-06-30 PROCEDURE — 86920 COMPATIBILITY TEST SPIN: CPT

## 2024-06-30 PROCEDURE — 25510000001 PERFLUTREN 6.52 MG/ML SUSPENSION: Performed by: INTERNAL MEDICINE

## 2024-06-30 PROCEDURE — 25010000002 NITROGLYCERIN 200 MCG/ML SOLUTION: Performed by: STUDENT IN AN ORGANIZED HEALTH CARE EDUCATION/TRAINING PROGRAM

## 2024-06-30 PROCEDURE — 93005 ELECTROCARDIOGRAM TRACING: CPT | Performed by: INTERNAL MEDICINE

## 2024-06-30 PROCEDURE — 84484 ASSAY OF TROPONIN QUANT: CPT | Performed by: INTERNAL MEDICINE

## 2024-06-30 PROCEDURE — 02703ZZ DILATION OF CORONARY ARTERY, ONE ARTERY, PERCUTANEOUS APPROACH: ICD-10-PCS | Performed by: INTERNAL MEDICINE

## 2024-06-30 PROCEDURE — 93880 EXTRACRANIAL BILAT STUDY: CPT | Performed by: SURGERY

## 2024-06-30 PROCEDURE — 36415 COLL VENOUS BLD VENIPUNCTURE: CPT | Performed by: INTERNAL MEDICINE

## 2024-06-30 PROCEDURE — 86901 BLOOD TYPING SEROLOGIC RH(D): CPT | Performed by: INTERNAL MEDICINE

## 2024-06-30 PROCEDURE — 25010000002 HEPARIN (PORCINE) 1000-0.9 UT/500ML-% SOLUTION: Performed by: INTERNAL MEDICINE

## 2024-06-30 PROCEDURE — 99153 MOD SED SAME PHYS/QHP EA: CPT | Performed by: INTERNAL MEDICINE

## 2024-06-30 PROCEDURE — 99285 EMERGENCY DEPT VISIT HI MDM: CPT

## 2024-06-30 RX ORDER — HEPARIN SODIUM 1000 [USP'U]/ML
30 INJECTION, SOLUTION INTRAVENOUS; SUBCUTANEOUS AS NEEDED
Status: DISCONTINUED | OUTPATIENT
Start: 2024-06-30 | End: 2024-06-30 | Stop reason: HOSPADM

## 2024-06-30 RX ORDER — LOSARTAN POTASSIUM 25 MG/1
25 TABLET ORAL DAILY
COMMUNITY

## 2024-06-30 RX ORDER — HEPARIN SODIUM 1000 [USP'U]/ML
60 INJECTION, SOLUTION INTRAVENOUS; SUBCUTANEOUS AS NEEDED
Status: DISCONTINUED | OUTPATIENT
Start: 2024-06-30 | End: 2024-06-30 | Stop reason: HOSPADM

## 2024-06-30 RX ORDER — ROSUVASTATIN CALCIUM 10 MG/1
10 TABLET, COATED ORAL DAILY
COMMUNITY

## 2024-06-30 RX ORDER — NITROGLYCERIN 20 MG/100ML
10-50 INJECTION INTRAVENOUS
Status: DISCONTINUED | OUTPATIENT
Start: 2024-06-30 | End: 2024-06-30 | Stop reason: DRUGHIGH

## 2024-06-30 RX ORDER — BENZONATATE 200 MG/1
200 CAPSULE ORAL 3 TIMES DAILY PRN
COMMUNITY

## 2024-06-30 RX ORDER — SODIUM CHLORIDE 0.9 % (FLUSH) 0.9 %
10 SYRINGE (ML) INJECTION AS NEEDED
Status: DISCONTINUED | OUTPATIENT
Start: 2024-06-30 | End: 2024-06-30 | Stop reason: HOSPADM

## 2024-06-30 RX ORDER — NITROGLYCERIN 0.4 MG/1
0.4 TABLET SUBLINGUAL
Status: DISCONTINUED | OUTPATIENT
Start: 2024-06-30 | End: 2024-06-30 | Stop reason: HOSPADM

## 2024-06-30 RX ORDER — LIDOCAINE HYDROCHLORIDE 20 MG/ML
INJECTION, SOLUTION INFILTRATION; PERINEURAL
Status: DISCONTINUED | OUTPATIENT
Start: 2024-06-30 | End: 2024-06-30 | Stop reason: HOSPADM

## 2024-06-30 RX ORDER — ALBUTEROL SULFATE 90 UG/1
1 AEROSOL, METERED RESPIRATORY (INHALATION) EVERY 4 HOURS PRN
COMMUNITY

## 2024-06-30 RX ORDER — HEPARIN SODIUM 1000 [USP'U]/ML
60 INJECTION, SOLUTION INTRAVENOUS; SUBCUTANEOUS ONCE
Status: DISCONTINUED | OUTPATIENT
Start: 2024-06-30 | End: 2024-06-30 | Stop reason: HOSPADM

## 2024-06-30 RX ORDER — DIPHENHYDRAMINE HYDROCHLORIDE 50 MG/ML
INJECTION INTRAMUSCULAR; INTRAVENOUS
Status: DISCONTINUED | OUTPATIENT
Start: 2024-06-30 | End: 2024-06-30 | Stop reason: HOSPADM

## 2024-06-30 RX ORDER — FENTANYL CITRATE 50 UG/ML
INJECTION, SOLUTION INTRAMUSCULAR; INTRAVENOUS
Status: DISCONTINUED | OUTPATIENT
Start: 2024-06-30 | End: 2024-06-30 | Stop reason: HOSPADM

## 2024-06-30 RX ORDER — HEPARIN SODIUM 1000 [USP'U]/ML
60 INJECTION, SOLUTION INTRAVENOUS; SUBCUTANEOUS ONCE
Status: DISCONTINUED | OUTPATIENT
Start: 2024-06-30 | End: 2024-06-30

## 2024-06-30 RX ORDER — ACETAMINOPHEN 325 MG/1
650 TABLET ORAL EVERY 4 HOURS PRN
Status: DISCONTINUED | OUTPATIENT
Start: 2024-06-30 | End: 2024-06-30 | Stop reason: HOSPADM

## 2024-06-30 RX ORDER — OMEPRAZOLE 20 MG/1
20 CAPSULE, DELAYED RELEASE ORAL DAILY
COMMUNITY

## 2024-06-30 RX ORDER — HEPARIN SODIUM 200 [USP'U]/100ML
INJECTION, SOLUTION INTRAVENOUS
Status: DISCONTINUED | OUTPATIENT
Start: 2024-06-30 | End: 2024-06-30 | Stop reason: HOSPADM

## 2024-06-30 RX ORDER — FLUTICASONE PROPIONATE 50 MCG
1 SPRAY, SUSPENSION (ML) NASAL DAILY
Status: ON HOLD | COMMUNITY
End: 2024-06-30

## 2024-06-30 RX ORDER — HEPARIN SODIUM 1000 [USP'U]/ML
INJECTION, SOLUTION INTRAVENOUS; SUBCUTANEOUS
Status: DISCONTINUED | OUTPATIENT
Start: 2024-06-30 | End: 2024-06-30 | Stop reason: HOSPADM

## 2024-06-30 RX ORDER — HEPARIN SODIUM 10000 [USP'U]/100ML
12 INJECTION, SOLUTION INTRAVENOUS
Status: DISCONTINUED | OUTPATIENT
Start: 2024-06-30 | End: 2024-06-30 | Stop reason: HOSPADM

## 2024-06-30 RX ORDER — MIDAZOLAM HYDROCHLORIDE 5 MG/5ML
INJECTION, SOLUTION INTRAMUSCULAR; INTRAVENOUS
Status: DISCONTINUED | OUTPATIENT
Start: 2024-06-30 | End: 2024-06-30 | Stop reason: HOSPADM

## 2024-06-30 RX ORDER — SODIUM CHLORIDE 9 MG/ML
100 INJECTION, SOLUTION INTRAVENOUS CONTINUOUS
Status: DISPENSED | OUTPATIENT
Start: 2024-06-30 | End: 2024-06-30

## 2024-06-30 RX ORDER — CETIRIZINE HYDROCHLORIDE 10 MG/1
10 TABLET ORAL DAILY
Status: ON HOLD | COMMUNITY
End: 2024-06-30

## 2024-06-30 RX ORDER — NITROGLYCERIN 20 MG/100ML
20 INJECTION INTRAVENOUS
Status: DISCONTINUED | OUTPATIENT
Start: 2024-06-30 | End: 2024-06-30 | Stop reason: HOSPADM

## 2024-06-30 RX ADMIN — IOPAMIDOL 100 ML: 755 INJECTION, SOLUTION INTRAVENOUS at 08:25

## 2024-06-30 RX ADMIN — PERFLUTREN 13.04 MG: 6.52 INJECTION, SUSPENSION INTRAVENOUS at 11:43

## 2024-06-30 RX ADMIN — NITROGLYCERIN 20 MCG/MIN: 20 INJECTION INTRAVENOUS at 04:45

## 2024-06-30 NOTE — ED PROVIDER NOTES
Subjective   History of Present Illness  Patient presents with chest pain.  Intermittent for couple weeks and then constant today.  Over the phone Dr. Nascimento at [facility] described as a burning pain like reflux.  The patient describes it to me as central pressure.  Improving with nitro, not gone.  Was transferred out of concern for NSTEMI.  EKG on arrival is consistent with STEMI.  Acuity of condition limits further history    Review of Systems   Constitutional:  Negative for chills and fever.   Respiratory:  Negative for shortness of breath.    Cardiovascular:  Positive for chest pain.   Gastrointestinal:  Negative for abdominal pain and vomiting.   Neurological:  Negative for syncope and light-headedness.       Past Medical History:   Diagnosis Date    Coronary artery disease     Hypertension        No Known Allergies    Past Surgical History:   Procedure Laterality Date    CARDIAC SURGERY         History reviewed. No pertinent family history.    Social History     Socioeconomic History    Marital status:    Tobacco Use    Smoking status: Every Day     Current packs/day: 0.50     Average packs/day: 0.5 packs/day for 24.5 years (12.2 ttl pk-yrs)     Types: Cigarettes     Start date: 2000    Smokeless tobacco: Never   Vaping Use    Vaping status: Never Used   Substance and Sexual Activity    Alcohol use: Never    Drug use: Never    Sexual activity: Defer           Objective   Physical Exam  Vitals reviewed.   Constitutional:       General: He is not in acute distress.  HENT:      Head: Normocephalic and atraumatic.   Eyes:      Extraocular Movements: Extraocular movements intact.      Conjunctiva/sclera: Conjunctivae normal.   Cardiovascular:      Pulses: Normal pulses.      Heart sounds: Normal heart sounds.   Pulmonary:      Effort: Pulmonary effort is normal. No respiratory distress.      Breath sounds: Normal breath sounds. No wheezing.   Abdominal:      General: Abdomen is flat. There is no distension.       Tenderness: There is no abdominal tenderness.   Musculoskeletal:         General: No swelling or tenderness.      Cervical back: Normal range of motion and neck supple.      Right lower leg: No edema.      Left lower leg: No edema.   Skin:     General: Skin is warm and dry.   Neurological:      General: No focal deficit present.      Mental Status: He is alert. Mental status is at baseline.   Psychiatric:         Behavior: Behavior normal.         Thought Content: Thought content normal.         Procedures           ED Course  ED Course as of 06/30/24 1900   Sun Jun 30, 2024   0430 [facility] Dr [facility] at [facility] is s/p ASA plavix had NTG with improvement but not resolution arrives on nitro paste, ECG pending [AS]      ED Course User Index  [AS] Bret Horowitz MD                                             Medical Decision Making  Amount and/or Complexity of Data Reviewed  Labs: ordered.  Radiology: ordered.  ECG/medicine tests: ordered.    Risk  Prescription drug management.  Decision regarding hospitalization.      Paris Schmidt is a 53 y.o. male with PMH above who presents to the Emergency Department with STEMI. Cath lab activated upon ECG review. S/p ASA and Lovenox so no heparin bolus given for now. NTG ordered. Taken to cath lab.      Final diagnosis: STEMI    All questions answered. Patient/family was understanding and in agreement with today's assessment and plan. The patient was monitored during their stay in the ED and dispositioned without acute event.    Electronically signed by:  Bret Horowitz MD 6/30/2024 19:00 CDT      Note: Dragon medical dictation software was used in the creation of this note.      Final diagnoses:   ST elevation myocardial infarction (STEMI), unspecified artery       ED Disposition  ED Disposition       ED Disposition   Decision to Admit    Condition   --    Comment   Level of Care: Critical Care [6]   Diagnosis: Acute ST elevation myocardial infarction (STEMI) of  anterior wall [[id number]]   Certification: I Certify That Inpatient Hospital Services Are Medically Necessary For Greater Than 2 Midnights                 Skinny Newton, APRN  [address] [place] [address]  [phone]               Medication List      No changes were made to your prescriptions during this visit.            Bret Horowitz MD  06/30/24 1647

## 2024-06-30 NOTE — Clinical Note
Cath lab staff took pt to CT prior to going to CCU 11.  Wife taken to waiting room outside CCU.  Dr Keller spoke with wife and son.

## 2024-06-30 NOTE — CONSULTS
Cardiac Surgery Consultation    Referring Physician: Dr. Roderick Keller    Chief Complaint   Patient presents with    Chest Pain         Subjective     History of Present Illness  Mr. Schmidt is a 53-year-old male who presents with new onset chest pain, he presented to an outside hospital about 1 AM was transferred to our facility where he was diagnosed with STEMI taken emergently to the Cath Lab.  I was called by Dr. Keller while he was in the Cath Lab.  He has a history of bypass 19 years ago at [facility] for which she had 3 vein grafts 1 to LAD 1 to diagonal in 1 to OM distribution.  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  Today he endorsed 2 weeks of on and off pain with activity but better when he rested.  Coronary angiography showed occluded vein grafts, moderate disease in the circumflex that was codominant and occluded LAD at its ostium.  There were faint left to left collaterals filling the LAD and diagonal.        Review of Systems     A complete review of systems was performed, is negative except stated above.    Past Medical History:   Diagnosis Date    Coronary artery disease     Hypertension      Past Surgical History:   Procedure Laterality Date    CARDIAC SURGERY       History reviewed. No pertinent family history.  Social History     Tobacco Use    Smoking status: Every Day     Current packs/day: 0.50     Average packs/day: 0.5 packs/day for 24.5 years (12.2 ttl pk-yrs)     Types: Cigarettes     Start date: 2000    Smokeless tobacco: Never   Vaping Use    Vaping status: Never Used   Substance Use Topics    Alcohol use: Never    Drug use: Never     Current Facility-Administered Medications   Medication Dose Route Frequency Provider Last Rate Last Admin    acetaminophen (TYLENOL) tablet 650 mg  650 mg Oral Q4H PRN Roderick Keller MD        heparin (porcine) injection 1,880 Units  30 Units/kg Intravenous PRN Roderick Keller MD        heparin (porcine) injection 3,770 Units  60  "Units/kg Intravenous PRN Roderick Keller MD        heparin (porcine) injection 3,770 Units  60 Units/kg Intravenous Once Roderick Keller MD        heparin 91603 units/250 mL (100 units/mL) in 0.45 % NaCl infusion  12 Units/kg/hr Intravenous Titrated Roderick Keller MD        nitroglycerin (NITROSTAT) SL tablet 0.4 mg  0.4 mg Sublingual Q5 Min PRN Roderick Keller MD        nitroglycerin (TRIDIL) 200 mcg/ml infusion  20 mcg/min Intravenous Titrated Roderick Keller MD        sodium chloride 0.9 % flush 10 mL  10 mL Intravenous PRN Roderick Keller MD        sodium chloride 0.9 % infusion  100 mL/hr Intravenous Continuous Roderick Keller MD         Allergies:  Patient has no known allergies.    Objective      Vital Signs  Visit Vitals  /93   Pulse 61   Temp 97.5 °F (36.4 °C) (Oral)   Resp 18   Ht 160 cm (63\")   Wt 62.8 kg (138 lb 7.2 oz)   SpO2 98%   BMI 24.53 kg/m²         Physical Exam  Constitutional:       General: He is not in acute distress.     Appearance: He is well-developed. He is not diaphoretic.   HENT:      Head: Normocephalic and atraumatic.      Right Ear: External ear normal.      Left Ear: External ear normal.   Eyes:      General:         Right eye: No discharge.         Left eye: No discharge.      Pupils: Pupils are equal, round, and reactive to light.   Neck:      Vascular: No JVD.      Trachea: No tracheal deviation.   Cardiovascular:      Rate and Rhythm: Normal rate and regular rhythm.      Heart sounds: Normal heart sounds. No murmur heard.     Comments: IABP in place right groin with 1:1 counterpulsation  Pulmonary:      Effort: Pulmonary effort is normal. No respiratory distress.      Breath sounds: Normal breath sounds. No stridor. No wheezing.   Abdominal:      General: There is no distension.      Palpations: Abdomen is soft.      Tenderness: There is no abdominal tenderness. There is no guarding.   Musculoskeletal:         General: No tenderness or deformity. Normal range of " motion.      Cervical back: Normal range of motion and neck supple.   Skin:     General: Skin is warm and dry.      Capillary Refill: Capillary refill takes less than 2 seconds.      Coloration: Skin is not pale.      Findings: No erythema or rash.   Neurological:      Mental Status: He is alert and oriented to person, place, and time.      Motor: No abnormal muscle tone.      Coordination: Coordination normal.   Psychiatric:         Behavior: Behavior normal.         Thought Content: Thought content normal.         Judgment: Judgment normal.         Results Review:     WBC   Date Value Ref Range Status   06/30/2024 10.70 3.40 - 10.80 10*3/mm3 Final     RBC   Date Value Ref Range Status   06/30/2024 5.16 4.14 - 5.80 10*6/mm3 Final     Hemoglobin   Date Value Ref Range Status   06/30/2024 15.9 13.0 - 17.7 g/dL Final     Hematocrit   Date Value Ref Range Status   06/30/2024 47.8 37.5 - 51.0 % Final     MCV   Date Value Ref Range Status   06/30/2024 92.6 79.0 - 97.0 fL Final     MCH   Date Value Ref Range Status   06/30/2024 30.8 26.6 - 33.0 pg Final     MCHC   Date Value Ref Range Status   06/30/2024 33.3 31.5 - 35.7 g/dL Final     RDW   Date Value Ref Range Status   06/30/2024 12.6 12.3 - 15.4 % Final     RDW-SD   Date Value Ref Range Status   06/30/2024 43.2 37.0 - 54.0 fl Final     MPV   Date Value Ref Range Status   06/30/2024 10.1 6.0 - 12.0 fL Final     Platelets   Date Value Ref Range Status   06/30/2024 195 140 - 450 10*3/mm3 Final     Neutrophil %   Date Value Ref Range Status   06/30/2024 67.3 42.7 - 76.0 % Final     Lymphocyte %   Date Value Ref Range Status   06/30/2024 25.5 19.6 - 45.3 % Final     Monocyte %   Date Value Ref Range Status   06/30/2024 5.2 5.0 - 12.0 % Final     Eosinophil %   Date Value Ref Range Status   06/30/2024 0.9 0.3 - 6.2 % Final     Basophil %   Date Value Ref Range Status   06/30/2024 0.5 0.0 - 1.5 % Final     Immature Grans %   Date Value Ref Range Status   06/30/2024 0.6 (H)  0.0 - 0.5 % Final     Neutrophils, Absolute   Date Value Ref Range Status   06/30/2024 7.20 (H) 1.70 - 7.00 10*3/mm3 Final     Lymphocytes, Absolute   Date Value Ref Range Status   06/30/2024 2.73 0.70 - 3.10 10*3/mm3 Final     Monocytes, Absolute   Date Value Ref Range Status   06/30/2024 0.56 0.10 - 0.90 10*3/mm3 Final     Eosinophils, Absolute   Date Value Ref Range Status   06/30/2024 0.10 0.00 - 0.40 10*3/mm3 Final     Basophils, Absolute   Date Value Ref Range Status   06/30/2024 0.05 0.00 - 0.20 10*3/mm3 Final     Immature Grans, Absolute   Date Value Ref Range Status   06/30/2024 0.06 (H) 0.00 - 0.05 10*3/mm3 Final     nRBC   Date Value Ref Range Status   06/30/2024 0.0 0.0 - 0.2 /100 WBC Final     Glucose   Date Value Ref Range Status   06/30/2024 128 (H) 65 - 99 mg/dL Final     Sodium   Date Value Ref Range Status   06/30/2024 142 136 - 145 mmol/L Final     Potassium   Date Value Ref Range Status   06/30/2024 4.0 3.5 - 5.2 mmol/L Final     CO2   Date Value Ref Range Status   06/30/2024 27.0 22.0 - 29.0 mmol/L Final     Chloride   Date Value Ref Range Status   06/30/2024 105 98 - 107 mmol/L Final     Anion Gap   Date Value Ref Range Status   06/30/2024 10.0 5.0 - 15.0 mmol/L Final     Creatinine   Date Value Ref Range Status   06/30/2024 0.86 0.76 - 1.27 mg/dL Final     BUN   Date Value Ref Range Status   06/30/2024 17 6 - 20 mg/dL Final     BUN/Creatinine Ratio   Date Value Ref Range Status   06/30/2024 19.8 7.0 - 25.0 Final     Calcium   Date Value Ref Range Status   06/30/2024 8.5 (L) 8.6 - 10.5 mg/dL Final        I reviewed the patient's clinical results and discussed with patient.    CT Chest:  MEDIASTINUM, HEART AND VASCULAR STRUCTURES: There is atheromatous  calcification of the thoracic aorta and coronary arteries. There is a  focus of air within one of the coronary grafts seen on image 68 of  series 5. This graft is occluded more distally seen best on series 10.  There may also be a short graft  extending to the right coronary artery  that appears occluded. There is no CT evidence of pulmonary embolus.  There is an aortic balloon pump within the descending thoracic aorta and  extending into the abdominal aorta. Heart size is upper limits of  normal. There are no enlarged hilar or mediastinal lymph nodes.     LUNGS: There is dependent atelectasis posteriorly. There is no dense  consolidation or pleural effusion.     UPPER ABDOMEN: There is mild thickening of the wall of the gallbladder.  There are no other acute findings in the upper abdomen.     BONES: Prior median sternotomy. Degenerative changes of the visualized  spine.        IMPRESSION:  1. Atheromatous calcification of the thoracic aorta and coronary  arteries. Prior heart bypass. There is a focus of air within one of the  bypass grafts image 68 of series 5. This is also seen on other  sequences. This graft appears to be occluded proximal to the air bubble  on series 10. There may also be a short graft leading to the right  coronary artery that may be occluded.  2. No CT evidence of pulmonary embolus.  3. Aortic balloon pump within the descending thoracic aorta and  extending into the abdominal aorta.  4. Dependent atelectasis.  5. Thickening of the gallbladder wall is nonspecific.        The full report of this exam was immediately signed and available to the  emergency room. The patient is currently in the emergency room.     This report was signed and finalized on 6/30/2024 9:05 AM by Dr. Jules Hagen MD.    Cath:  Findings:    Hemodynamics:  -Opening pressure: 125/71, mean 82  -Left heart catheterization: , EDP 28. Ao: 130/82, mean 104.     Left ventriculography:  1. The contractility of the left ventricle is abnormal, with apical hypokinesis. Estimated ejection fraction 45-50%.  2. The left ventricle is normal in wall thickness and chamber size.  3. There is no significant mitral regurgitation.    Selective coronary angiography:  "  Dominance: Right    Left Main coronary artery: Large-caliber vessel that arises normally from the left cusp and bifurcates. There is diffuse tapering of the mid to distal vessel ipsilateral to the LAD origin, with severe calcification. Distally, this amounts to 50% stenosis.    Left anterior descending artery: 100% ostial chronic total occlusion. The proximal portion is visualized fluoroscopically with severe calcification. There are bridging collaterals that show faint, EARNEST-1 antegrade flow into the LAD and a diagonal branch. The diagonal branch has more contrast staining. There are other left to left collaterals that also helped to show visualization of the mid to distal LAD. The vessel appears to be medium in caliber and reasonable target for graft placement.     Left circumflex: Medium caliber vessel arising at a rather acute angle from the distal left main that is not dominant for posterior circulation, supplying 2 medium caliber obtuse marginal branches. The AV groove portion of the vessel has diffuse moderate calcification and diffuse mild disease. OM1 is free of significant disease. The AV groove portion beyond this has diffuse mild disease. There is a focal 70% stenosis at the ostium of the OM 2, then another eccentric 50% stenosis in its proximal portion.    Right coronary artery: Medium caliber vessel arising normally from the right cusp that is dominant for posterior circulation, supplying an acute marginal and a few small PL branches with no definitive PDA (of note, catheterization before surgery 2009 noted similar conclusions (i.e., no \"true PDA\" was seen. The proximal RCA has an eccentric 70-80% stenosis. The acute marginal branch has eccentric 50-60% stenosis proximally.    Bypass graft angiography:  -SVG to LAD: 100% thrombotic occlusion in its midportion. Review of operative report indicates that this vein graft also supplied an SVG to diagonal branch.  -SVG to OM: 100% occluded at its " ostium.  -LANI: Arises normally and appears to have been dissected as presents of clips on fluoroscopy are noted, but does not traverse inferiorly enough to have anastomosis with any epicardial coronary vessel. Later, operative report was received which indicated the vessel had been dissected but then surgeon realize it was too small for use.  -LIMA: Arises normally from the left subclavian but appears atretic. There are no clips on the side of the chest. Subsequent review of the operative report noted the surgeon examined it and realized it had a long fibrous core making it atretic and chose not to use it.    Percutaneous coronary intervention: Given the above findings, it was suspected that a graft arising from the aorta and likely inserting to the diagonal and/or LAD was the acute culprit, with thrombotic occlusion. There was faint collateral filling via left to left collaterals allowing some visualization of the LAD and diagonal bifurcation in the mid to distal LAD. There appeared to be some bridging collaterals from the left main into the LAD, but it did appear to be a chronic total occlusion. Given the lack of anterograde flow into the system, and suspected thrombotic occlusion of vein graft that had been supplying that, I attempted native vessel revascularization. Heparin bolus was administered. As such, a 6 Marshallese XB 3.5 guiding catheter was used to engage the left main. I attempted to advance a whisper ES wire into the LAD but was unsuccessful so it was left in the circumflex. I then tried a Fielder XT wire which also could not penetrate the proximal cap of the ostial . At this point, I felt it best to have the patient evaluated for surgical revascularization and removed all equipment. Final angiography showed no change compared to pre-interventional attempts.     Estimated Blood Loss: <50mL  Specimens: None  Complications: None       Impression:  1. Native three vessel coronary artery disease.  2. Acute  anterior myocardial infarction, as a result of thrombotic occlusion of a saphenous vein graft supplying an LAD and (by way of composite SVG attached to this one) a diagonal branch (known after reviewing operative report that was acquired during the latter phases of this case)  3. Some native left to left collaterals bilaterally visualization of the LAD and diagonal branch, preserving most of systolic function of the anterior wall, and likely preventing this current infarction from making the patient hemodynamically and electrically unstable  4. LVEF 45-50%, with apical hypokinesis  5. Elevated LVEDP  6. Successful placement of intra-aortic balloon pump (40 cc), to help maintain coronary perfusion (particularly through collaterals noted to the LAD/diagonal system) while patient is being evaluated for redo surgical revascularization  7. No availability of mammary arteries for conduit usage; the LANI had been dissected but not used at time of original surgery, and LIMA was recognized as having been atretic at that time     Plan:   1. After discussion with Dr. Bermudez, patient will be transported for CTA of the chest and then to the ICU, where he will be evaluated for options regarding high risk redo surgical revascularization. It is acknowledged that available conduits to use may be very limited, though all of patient's saphenous vein grafts were taken from his left leg at time of original 3v CABG in '09 (SVG->LAD, SVG->diagonal, and SVG->OM).  2. If he can be taken for redo CABG, of course the primary goal would be to achieve restored antegrade flow to the anterior wall (LAD and diagonal). Vein grafts could also be used for anastomosis to OM 2 and distal RCA if possible.   3. Remain on strict bedrest with IABP 1:1  4. Remain on heparin infusion per ACS protocol  5. Initiate and maintain nitroglycerin infusion at 20 mcg/min for vasodilation        Roderick Keller MD     I personally reviewed images of following exams, the  following is my interpretation:    CT Chest:  Previous cardiac surgery with Robicsek closure of sternum, it appears the RV and majority of heart is adhesed to the underside of left chest, graft seen and graft going to the LAD distribution is close to sternotomy wires    ECHO: LV function at least moderately reduced with hypo or akinesis of the anterior wall, normal movement of lateral and inferior wall no significant valvular disease  CATH: Left dominant or possibly slightly codominant coronary system with occluded LAD, all vein grafts are occluded, moderate disease in the circumflex with left to left collaterals filling a normal-appearing caliber LAD and diagonal        Assessment & Plan     Mr. Schmidt is a 53-year-old male who presents with STEMI.  He has a history of coronary bypass grafting x 3 with vein grafts only due to unusable DARNELL's.  His chest pains improved high-sensitivity troponin was elevated to 300 our facility and his pain started approximately 12 hours ago.  He has been having 2 weeks of unstable anginal type symptoms.  I reviewed his imaging including his CT chest, his reentry sternotomy will be very high risk as I suspect there is extensive adhesions to the underside of the left chest wall.  At previous surgery per op note it is noted that there was bleeding causing reentry into the chest after closure with bleeding at the chest wall near the site.  Therefore I believe this would be high risk for redo sternotomy for bypass.  I am also concerned about quality of conduits given previous problems.  Currently he is hemodynamically stable we have a balloon pump in place to aid in perfusion through collaterals to his LAD.    Discussed the case extensively with Dr. Keller, I did call and discussed the case with Dr. Elise Marcos, Cardiac Surgeon at [facility] in [facility].  He is graciously agreed to take the patient in transfer if that is what the family wants.  There can perform more guided myocardial  viability testing and if suspected viable myocardium consider high risk revascularization.    Thank you for trusting me with the care of Mr. Schmidt.  Please do not hesitate to call with any questions or concerns.    Ronn Bermudez MD  Cardiothoracic Surgeon

## 2024-06-30 NOTE — DISCHARGE SUMMARY
"  [facility] HEART GROUP DISCHARGE    Date of Discharge:  6/30/2024    Discharge Diagnosis:   1.  Acute anterior STEMI, due to thrombotic occlusion of saphenous vein graft supplying LAD (and also composite SVG supplying diagonal branch)  2.  Tobacco abuse  3.  Essential hypertension    Presenting Problem/History of Present Illness  (From H&P):  53-year-old male with known CAD including three-vessel CABG in 2009 (followed thereafter with Dr. Morelos until he (in a few years ago), who presented to his local ER tonight complaining of ongoing chest discomfort.  For the last 2 weeks, he has described intermittent but self resolving episodes of substernal chest discomfort.  This morning around 3 AM, he notes the discomfort returned but did not go away as it been had been doing.  He describes it as a squeezing sensation and has had associated diaphoresis and shortness of breath.  He presented to his ER where initial ECG showed ischemic changes but no ST elevation.  He was given full dose aspirin Lovenox and transferred here for a \"non-STEMI\" is the initial troponin there was minimally elevated at 0.472 (range 0-0.033).  Upon arrival here, despite repeated doses of nitroglycerin, he was still having ongoing moderate degree of chest discomfort and his ECG looked different, so \"code STEMI\" was activated.     Upon arrival to the Cath Lab, he is still complaining of ongoing chest discomfort.    Hospital Course    Patient was brought directly to the Cath Lab.  Please see catheterization report for full details.  Of note, he informed me he had a prior three-vessel CABG in 2009, performed at [facility], though operative details were not available at the time we started the emergent case.  Locating and assimilating his coronary and bypass graft angiography proved rather difficult, but thankfully near the lateral portion of the catheter lab case, we were able to obtain a faxed report from [facility] including his original " operative report from 2009.  These findings confirmed suspicions on catheterization; neither LIMA was used.  Apparently the right DARNELL was initially dissected but then felt to be too small it was never anastomosed to the LAD.  The LIMA was felt to be atretic with a long fibrous cord at the time by the surgeon, so was not used.  3 vein grafts were used, one of the LAD, 1 to a diagonal, and 1 to an OM branch.  Operative description mentioned a vein to vein conduit to the diagonal, suggesting to me it was a composite off of the vein graft arising from the ascending aorta that inserted into the LAD.  This vein graft proved to have a thrombotic occlusion in the Cath Lab.  There were faint left to left collaterals showing EARNEST I flow into the LAD and diagonal distributions, and left ventriculography showed apical hypokinesis with an estimated LVEF of 45 to 50%.  I attempted to restore anterior wall flow through the native LAD, but it was 100% ostial chronic total occlusion with severe calcification.  I cannot advance a wire into this vessel despite numerous attempts.    The patient did remain hemodynamically and electrically stable throughout the case check felt like was undoubtedly due, at least in part, to the collaterals noted to the LAD and diagonal distributions.  I did urgently consult with our CT surgeon, Dr. Bermudez.  After we discussed the case, I placed an intra-aortic balloon pump to maintain augmented coronary perfusion, particularly through the aforementioned collaterals, to hopefully continue protection of the anterior wall during this infarction.  Patient was taken for CTA of the chest, which Dr. Bermudez reviewed and noted extensive adhesions underneath the chest wall.  He also reviewed the prior operative note and some mention of a reentry into the chest after initial closure because of bleeding near the chest wall site.  He therefore felt the patient be too high risk for redo sternotomy and had valid concerns  regarding quality of conduits available.    In light of all the above, Dr. Bemrudez did discuss the case with Dr. Oliver to at [facility] in [place].  Patient and family agreed to transfer as well.    Patient will be transferred furred from our CCU to [facility] to be evaluated for redo CABG, with IABP in place with 1:1 counterpulsation.  He has remained hemodynamically stable and rhythm has remained stable as well.  He is on heparin infusion as well as low-dose nitroglycerin infusion to maintain vasodilatation, particularly of the collaterals.        Procedures Performed  Procedure(s):  Left Heart Cath  06/30 0733 Note By: Roderick Keller MD    Consults:   Consults       Date and Time Order Name Status Description    6/30/2024  9:04 AM Inpatient Cardiothoracic Surgery Consult              Pertinent Test Results:     Echo report pending    Initial high-sensitivity troponin upon arrival here it was 300, with repeat 5 hours later at 2333.  This is a high-sensitivity troponin T.  Initial metabolic profile normal, with creatinine 0.86.  Sodium 142 and potassium 4.0.  CBC with WBC 10.7, hemoglobin 15.9, platelets 195.    Type and screen: A+.      Condition on Discharge: Stable but guarded    Physical Exam at Discharge    Vital Signs  Temp:  [97.5 °F (36.4 °C)-98.2 °F (36.8 °C)] 97.5 °F (36.4 °C)  Heart Rate:  [61-85] 67  Resp:  [18] 18  BP: (120-150)/(82-96) 127/95    Physical Exam:  Vitals and nursing note reviewed.   Constitutional:       General: Not in acute distress.     Appearance: Acutely ill-appearing.   Neck:      Vascular: No JVD or JVR. JVD normal.   Pulmonary:      Effort: Pulmonary effort is normal.      Breath sounds: Normal breath sounds.   Cardiovascular:      Normal rate. Regular rhythm.      Murmurs: There is no murmur.      No gallop.  No rub.   Pulses:     Intact distal pulses.   Edema:     Peripheral edema absent.   Skin:     General: Skin is warm and dry.   Neurological:      Mental Status: Alert,  oriented to person, place, and time and oriented to person, place and time.         Discharge Disposition: to [facility] (DC - External)      Current Facility-Administered Medications:     acetaminophen (TYLENOL) tablet 650 mg, 650 mg, Oral, Q4H PRN, Roderick Keller MD    heparin (porcine) injection 1,880 Units, 30 Units/kg, Intravenous, PRN, Roderick Keller MD    heparin (porcine) injection 3,770 Units, 60 Units/kg, Intravenous, PRN, Roderick Keller MD    heparin (porcine) injection 3,770 Units, 60 Units/kg, Intravenous, Once, Roderick Keller MD    heparin 72416 units/250 mL (100 units/mL) in 0.45 % NaCl infusion, 12 Units/kg/hr, Intravenous, Titrated, Roderick Keller MD    nitroglycerin (NITROSTAT) SL tablet 0.4 mg, 0.4 mg, Sublingual, Q5 Min PRN, Roderick Keller MD    nitroglycerin (TRIDIL) 200 mcg/ml infusion, 20 mcg/min, Intravenous, Titrated, Roderick Keller MD, Last Rate: 6 mL/hr at 06/30/24 0830, 20 mcg/min at 06/30/24 0830    sodium chloride 0.9 % flush 10 mL, 10 mL, Intravenous, PRN, Roderick Keller MD      Prior outpatient medications     Discharge Medications        ASK your doctor about these medications        Instructions Start Date   albuterol sulfate  (90 Base) MCG/ACT inhaler  Commonly known as: PROVENTIL HFA;VENTOLIN HFA;PROAIR HFA   1 puff, Inhalation, Every 4 Hours PRN      benzonatate 200 MG capsule  Commonly known as: TESSALON   200 mg, Oral, 3 Times Daily PRN      losartan 25 MG tablet  Commonly known as: COZAAR   25 mg, Oral, Daily      omeprazole 20 MG capsule  Commonly known as: priLOSEC   20 mg, Oral, Daily      rosuvastatin 10 MG tablet  Commonly known as: CRESTOR   10 mg, Oral, Daily               Discharge Diet: N.p.o.    Activity at Discharge: Currently on bedrest with IABP in place    Follow-up Appointments: to be determined after discharge from [facility]  No future appointments.      Test Results Pending at Discharge: echocardiogram    (Though images are being burned to disc which also included films today's cardiac catheterization)    I spent a total of 135 minutes in care for this patient today, excluding time spent performing cardiac catheterization this time does include consultation and conversations with surgery here, review of diagnostics, and coordinating care to transfer patient       Roderick Keller MD  06/30/24  13:50 CDT

## 2024-06-30 NOTE — H&P
"    [facility] - CARDIOLOGY  HISTORY AND PHYSICAL    Date of Admission: 6/30/2024  Primary Care Physician: Skinny Newton APRN    Subjective     Chief Complaint: Chest pain    Chest Pain         53-year-old male with known CAD including three-vessel CABG in 2009 (followed thereafter with Dr. Morelos until he (in a few years ago), who presented to his local ER tonight complaining of ongoing chest discomfort.  For the last 2 weeks, he has described intermittent but self resolving episodes of substernal chest discomfort.  This morning around 3 AM, he notes the discomfort returned but did not go away as it been had been doing.  He describes it as a squeezing sensation and has had associated diaphoresis and shortness of breath.  He presented to his ER where initial ECG showed ischemic changes but no ST elevation.  He was given full dose aspirin Lovenox and transferred here for a \"non-STEMI\" is the initial troponin there was minimally elevated at 0.472 (range 0-0.033).  Upon arrival here, despite repeated doses of nitroglycerin, he was still having ongoing moderate degree of chest discomfort and his ECG looked different, so \"code STEMI\" was activated.    Upon arrival to the Cath Lab, he is still complaining of ongoing chest discomfort.    Review of Systems   Cardiovascular:  Positive for chest pain.      Otherwise complete ROS reviewed and negative except as mentioned in the HPI.    Past Medical History:   Past Medical History:   Diagnosis Date    Coronary artery disease     Hypertension        Past Surgical History:  Past Surgical History:   Procedure Laterality Date    CARDIAC SURGERY         Family History: family history is not on file.    Social History:  reports that he has been smoking cigarettes. He started smoking about 24 years ago. He has a 12.2 pack-year smoking history. He has never used smokeless tobacco. He reports that he does not drink alcohol and does not use drugs.    Medications:  Prior to Admission medications  " "  Medication Sig Start Date End Date Taking? Authorizing Provider   losartan (COZAAR) 25 MG tablet Take 1 tablet by mouth Daily.    Provider, Marlo, MD     Allergies:  No Known Allergies    Objective     Vital Signs: /90   Pulse 65   Temp 98.2 °F (36.8 °C) (Oral)   Resp 18   Ht 160 cm (63\")   Wt 61.7 kg (136 lb)   SpO2 96%   BMI 24.09 kg/m²     Vitals and nursing note reviewed.   Constitutional:       General: Not in acute distress.     Appearance: Acutely ill-appearing.   Neck:      Vascular: No JVD or JVR. JVD normal.   Pulmonary:      Effort: Pulmonary effort is normal.      Breath sounds: Normal breath sounds.   Cardiovascular:      Normal rate. Regular rhythm.      Murmurs: There is no murmur.      No gallop.  No rub.   Pulses:     Intact distal pulses.   Edema:     Peripheral edema absent.   Skin:     General: Skin is cool and dry.   Neurological:      Mental Status: Alert, oriented to person, place, and time and oriented to person, place and time.         Results Reviewed:  I have reviewed all laboratory data, with pertinent findings: Labs received at [facility] prior to transfer include a CBC that shows a WBC of 10.7, hemoglobin 40 or 16.0, platelets 206.  CMP is normal including creatinine 0.9 with an estimated GFR of around 100.  Troponin was minimally elevated there at 0.472 (range 0-0.033).  proBNP was normal at 381.      I have reviewed telemetry, which shows sinus rhythm  I have visualized all the electrocardiograms performed, with my interpretations to follow:   -Initial ECG at [facility] performed this morning at 1:26 AM sinus rhythm, 84 bpm, ST depression in the inferior leads with peaked T waves and slight J-point elevation in precordial leads V2 through V4 and some downsloping ST depression in V6.  -ECG performed here upon arrival at 432 shows normal sinus rhythm, rate of 80, T wave inversion in 1 and aVL with more pronounced (now 2 mm) J-point elevation " in leads V3-V5.  Compared to ECG performed prior to transfer from transferring facility, inferior ST depression is no longer present and J-point elevation in the precordial leads is more notable      Assessment / Plan        Active Hospital Problems    Diagnosis     Acute ST elevation myocardial infarction (STEMI) of anterior wall     Tobacco abuse     Primary hypertension     Hyperlipidemia LDL goal <70      Recommendations and plans: Will proceed emergently diagnostic coronary and bypass graft angiography, with further recommendations and plans to follow.    Code Status: Full     I discussed the patients findings and my recommendations with: Patient    Estimated length of stay: Unknown    Roderick Keller MD   06/30/24   05:25 CDT

## 2024-06-30 NOTE — Clinical Note
IABP inserted in the right groin. suture, stat-lock and transparent dressing used to secure. IABP verified with fluoroscopy.Ratio: 1:1.Augmented pressure (mmHg): 131.

## 2024-07-01 LAB
ACT BLD: 214 SECONDS (ref 82–152)
QT INTERVAL: 386 MS
QT INTERVAL: 444 MS
QTC INTERVAL: 444 MS
QTC INTERVAL: 445 MS

## 2024-07-02 NOTE — PAYOR COMM NOTE
"ADMIT INPT 6-30-24  DC HOME 6-30-24  UR [id number] [phone]  PHONE       Paris Schmidt (53 y.o. Male)       Date of Birth   1970    Social Security Number       Address   [address] [place] [address]    Home Phone   [phone]    MRN   [id number]       Taoism   Other    Marital Status                               Admission Date   6/30/24    Admission Type   Emergency    Admitting Provider   Rdoerick Keller MD    Attending Provider       Department, Room/Bed   [facility] CARDIAC CARE, C011/1       Discharge Date   6/30/2024    Discharge Disposition   [facility] Hospital (DC - External)    Discharge Destination                                 Attending Provider: (none)   Allergies: No Known Allergies    Isolation: None   Infection: None   Code Status: Prior    Ht: 160 cm (63\")   Wt: 62.8 kg (138 lb 7.2 oz)    Admission Cmt: None   Principal Problem: Acute ST elevation myocardial infarction (STEMI) of anterior wall [I21.09]                   Active Insurance as of 6/30/2024       Primary Coverage       Payor Plan Insurance Group Employer/Plan Group    [employer] [place] EXCHANGE [employer] EXCHANGE [id number]       Payor Plan Address Payor Plan Phone Number Payor Plan Fax Number Effective Dates    [address] [phone]  1/1/2024 - None Entered    [facility] [id number]         Subscriber Name Subscriber Birth Date Member ID       PARIS SCHMIDT 1970 [id number]                     Emergency Contacts        (Rel.) Home Phone Work Phone Mobile Phone    CHELO DEY (Spouse) [phone] -- [phone]    .,Scottie (Other) -- -- [phone]                 History & Physical        Roderick Keller MD at 06/30/24 0518              [facility] - CARDIOLOGY  HISTORY AND PHYSICAL    Date of Admission: 6/30/2024  Primary Care Physician: Skinny Newton APRN    Subjective     Chief Complaint: Chest pain    Chest Pain         53-year-old male with " "known CAD including three-vessel CABG in 2009 (followed thereafter with Dr. Morelos until he (in a few years ago), who presented to his local ER tonight complaining of ongoing chest discomfort.  For the last 2 weeks, he has described intermittent but self resolving episodes of substernal chest discomfort.  This morning around 3 AM, he notes the discomfort returned but did not go away as it been had been doing.  He describes it as a squeezing sensation and has had associated diaphoresis and shortness of breath.  He presented to his ER where initial ECG showed ischemic changes but no ST elevation.  He was given full dose aspirin Lovenox and transferred here for a \"non-STEMI\" is the initial troponin there was minimally elevated at 0.472 (range 0-0.033).  Upon arrival here, despite repeated doses of nitroglycerin, he was still having ongoing moderate degree of chest discomfort and his ECG looked different, so \"code STEMI\" was activated.    Upon arrival to the Cath Lab, he is still complaining of ongoing chest discomfort.    Review of Systems   Cardiovascular:  Positive for chest pain.      Otherwise complete ROS reviewed and negative except as mentioned in the HPI.    Past Medical History:   Past Medical History:   Diagnosis Date    Coronary artery disease     Hypertension        Past Surgical History:  Past Surgical History:   Procedure Laterality Date    CARDIAC SURGERY         Family History: family history is not on file.    Social History:  reports that he has been smoking cigarettes. He started smoking about 24 years ago. He has a 12.2 pack-year smoking history. He has never used smokeless tobacco. He reports that he does not drink alcohol and does not use drugs.    Medications:  Prior to Admission medications    Medication Sig Start Date End Date Taking? Authorizing Provider   losartan (COZAAR) 25 MG tablet Take 1 tablet by mouth Daily.    Provider, Marlo, MD     Allergies:  No Known Allergies    Objective " "    Vital Signs: /90   Pulse 65   Temp 98.2 °F (36.8 °C) (Oral)   Resp 18   Ht 160 cm (63\")   Wt 61.7 kg (136 lb)   SpO2 96%   BMI 24.09 kg/m²     Vitals and nursing note reviewed.   Constitutional:       General: Not in acute distress.     Appearance: Acutely ill-appearing.   Neck:      Vascular: No JVD or JVR. JVD normal.   Pulmonary:      Effort: Pulmonary effort is normal.      Breath sounds: Normal breath sounds.   Cardiovascular:      Normal rate. Regular rhythm.      Murmurs: There is no murmur.      No gallop.  No rub.   Pulses:     Intact distal pulses.   Edema:     Peripheral edema absent.   Skin:     General: Skin is cool and dry.   Neurological:      Mental Status: Alert, oriented to person, place, and time and oriented to person, place and time.         Results Reviewed:  I have reviewed all laboratory data, with pertinent findings: Labs received at [facility] prior to transfer include a CBC that shows a WBC of 10.7, hemoglobin 40 or 16.0, platelets 206.  CMP is normal including creatinine 0.9 with an estimated GFR of around 100.  Troponin was minimally elevated there at 0.472 (range 0-0.033).  proBNP was normal at 381.      I have reviewed telemetry, which shows sinus rhythm  I have visualized all the electrocardiograms performed, with my interpretations to follow:   -Initial ECG at [facility] performed this morning at 1:26 AM sinus rhythm, 84 bpm, ST depression in the inferior leads with peaked T waves and slight J-point elevation in precordial leads V2 through V4 and some downsloping ST depression in V6.  -ECG performed here upon arrival at 432 shows normal sinus rhythm, rate of 80, T wave inversion in 1 and aVL with more pronounced (now 2 mm) J-point elevation in leads V3-V5.  Compared to ECG performed prior to transfer from transferring facility, inferior ST depression is no longer present and J-point elevation in the precordial leads is more " notable      Assessment / Plan        Active Hospital Problems    Diagnosis     Acute ST elevation myocardial infarction (STEMI) of anterior wall     Tobacco abuse     Primary hypertension     Hyperlipidemia LDL goal <70      Recommendations and plans: Will proceed emergently diagnostic coronary and bypass graft angiography, with further recommendations and plans to follow.    Code Status: Full     I discussed the patients findings and my recommendations with: Patient    Estimated length of stay: Unknown    Roderick Keller MD   06/30/24   05:25 CDT      Electronically signed by Roderick Keller MD at 06/30/24 0591          Emergency Department Notes        Bret Horowitz MD at 06/30/24 0430          Subjective   History of Present Illness  Patient presents with chest pain.  Intermittent for couple weeks and then constant today.  Over the phone Dr. Nascimento at [place] described as a burning pain like reflux.  The patient describes it to me as central pressure.  Improving with nitro, not gone.  Was transferred out of concern for NSTEMI.  EKG on arrival is consistent with STEMI.  Acuity of condition limits further history    Review of Systems   Constitutional:  Negative for chills and fever.   Respiratory:  Negative for shortness of breath.    Cardiovascular:  Positive for chest pain.   Gastrointestinal:  Negative for abdominal pain and vomiting.   Neurological:  Negative for syncope and light-headedness.       Past Medical History:   Diagnosis Date    Coronary artery disease     Hypertension        No Known Allergies    Past Surgical History:   Procedure Laterality Date    CARDIAC SURGERY         History reviewed. No pertinent family history.    Social History     Socioeconomic History    Marital status:    Tobacco Use    Smoking status: Every Day     Current packs/day: 0.50     Average packs/day: 0.5 packs/day for 24.5 years (12.2 ttl pk-yrs)     Types: Cigarettes     Start date: 2000    Smokeless  tobacco: Never   Vaping Use    Vaping status: Never Used   Substance and Sexual Activity    Alcohol use: Never    Drug use: Never    Sexual activity: Defer           Objective   Physical Exam  Vitals reviewed.   Constitutional:       General: He is not in acute distress.  HENT:      Head: Normocephalic and atraumatic.   Eyes:      Extraocular Movements: Extraocular movements intact.      Conjunctiva/sclera: Conjunctivae normal.   Cardiovascular:      Pulses: Normal pulses.      Heart sounds: Normal heart sounds.   Pulmonary:      Effort: Pulmonary effort is normal. No respiratory distress.      Breath sounds: Normal breath sounds. No wheezing.   Abdominal:      General: Abdomen is flat. There is no distension.      Tenderness: There is no abdominal tenderness.   Musculoskeletal:         General: No swelling or tenderness.      Cervical back: Normal range of motion and neck supple.      Right lower leg: No edema.      Left lower leg: No edema.   Skin:     General: Skin is warm and dry.   Neurological:      General: No focal deficit present.      Mental Status: He is alert. Mental status is at baseline.   Psychiatric:         Behavior: Behavior normal.         Thought Content: Thought content normal.         Procedures          ED Course  ED Course as of 06/30/24 1900   Sun Jun 30, 2024   0430 Per Dr Nascimento at [facility] is s/p ASA plavix had NTG with improvement but not resolution arrives on nitro paste, ECG pending [AS]      ED Course User Index  [AS] Bret Horowitz MD                                             Medical Decision Making  Amount and/or Complexity of Data Reviewed  Labs: ordered.  Radiology: ordered.  ECG/medicine tests: ordered.    Risk  Prescription drug management.  Decision regarding hospitalization.      Paris Schmidt is a 53 y.o. male with PMH above who presents to the Emergency Department with STEMI. Cath lab activated upon ECG review. S/p ASA and Lovenox so no heparin bolus given for now.  NTG ordered. Taken to cath lab.      Final diagnosis: STEMI    All questions answered. Patient/family was understanding and in agreement with today's assessment and plan. The patient was monitored during their stay in the ED and dispositioned without acute event.    Electronically signed by:  Bret Horowitz MD 2024 19:00 CDT      Note: Dragon medical dictation software was used in the creation of this note.      Final diagnoses:   ST elevation myocardial infarction (STEMI), unspecified artery       ED Disposition  ED Disposition       ED Disposition   Decision to Admit    Condition   --    Comment   Level of Care: Critical Care [6]   Diagnosis: Acute ST elevation myocardial infarction (STEMI) of anterior wall [[id number]]   Certification: I Certify That Inpatient Hospital Services Are Medically Necessary For Greater Than 2 Midnights                 Skinny Newton, APRN  [address] [place] [id number] [phone]               Medication List      No changes were made to your prescriptions during this visit.            Bret Horowitz MD  24 190      Electronically signed by Bret Horowitz MD at 24 1900       Facility-Administered Medications as of 2024   Medication Dose Route Frequency Provider Last Rate Last Admin    [COMPLETED] iopamidol (ISOVUE-370) 76 % injection 100 mL  100 mL Intravenous Once in imaging Bret Horowitz MD   100 mL at 24 0825    [COMPLETED] perflutren (DEFINITY) lipid microspheres injection 13.04 mg  2 mL Intravenous Once Roderick Keller MD   13.04 mg at 24 1143    [] sodium chloride 0.9 % infusion  100 mL/hr Intravenous Continuous Roderick Keller  mL/hr at 24 0830 100 mL/hr at 24 0830     Orders (all)        Start     Ordered    24 0853  POC Activated Clotting Time  PROCEDURE ONCE         24 0717    24 0600  CBC & Differential  Every Third Day,   Status:  Canceled      Comments:  Discontinue After Heparin Stopped      06/30/24 0904    06/30/24 1430  aPTT  Once,   Status:  Canceled         06/30/24 1320    06/30/24 1343  Discharge patient  Once         06/30/24 1342    06/30/24 1239  Patient Antigen Type  Once         06/30/24 1238    06/30/24 1230  perflutren (DEFINITY) lipid microspheres injection 13.04 mg  Once         06/30/24 1142    06/30/24 1200  Strict intake and output  Every 4 Hours,   Status:  Canceled       06/30/24 0904    06/30/24 1200  aPTT  Every 6 Hours,   Status:  Canceled      Comments: During Infusion, Once 2 Consecutive Therapeutic Levels Are Drawn, Change to Every Morning      06/30/24 0904    06/30/24 1200  aPTT  Timed,   Status:  Canceled         06/30/24 1022    06/30/24 1200  aPTT  Once,   Status:  Canceled         06/30/24 1108    06/30/24 1112  Antibody Identification  Once         06/30/24 1111    06/30/24 1050  Prepare RBC, 2 Units  Blood - Once         06/30/24 1049    06/30/24 1000  Incentive Spirometry  Every 2 Hours While Awake,   Status:  Canceled       06/30/24 0904    06/30/24 1000  sodium chloride 0.9 % infusion  Continuous         06/30/24 0904    06/30/24 1000  heparin (porcine) injection 3,700 Units  Once,   Status:  Discontinued         06/30/24 0904    06/30/24 1000  heparin 83627 units/250 mL (100 units/mL) in 0.45 % NaCl infusion  Titrated,   Status:  Discontinued         06/30/24 0904    06/30/24 1000  nitroglycerin (TRIDIL) 200 mcg/ml infusion  Titrated,   Status:  Discontinued         06/30/24 0904    06/30/24 1000  heparin (porcine) injection 3,770 Units  Once,   Status:  Discontinued         06/30/24 0910    06/30/24 0940  ECG 12 Lead Chest Pain  STAT         06/30/24 0939    06/30/24 0904  Code Status and Medical Interventions:  Continuous,   Status:  Canceled         06/30/24 0904    06/30/24 0904  Continuous Cardiac Monitoring  Continuous,   Status:  Canceled        Comments: Follow Standing Orders As Outlined in Process Instructions (Open  Order Report to View Full Instructions)    06/30/24 0904 06/30/24 0904  Maintain IV Access  Continuous,   Status:  Canceled         06/30/24 0904    06/30/24 0904  Telemetry - Place Orders & Notify Provider of Results When Patient Experiences Acute Chest Pain, Dysrhythmia or Respiratory Distress  Continuous,   Status:  Canceled        Comments: Open Order Report to View Parameters Requiring Provider Notification    06/30/24 0904 06/30/24 0904  May Be Off Telemetry for Tests  Continuous,   Status:  Canceled         06/30/24 0904    06/30/24 0904  Encourage fluids  Until Discontinued,   Status:  Canceled         06/30/24 0904 06/30/24 0904  Oxygen Therapy- Nasal Cannula; Titrate 1-6 LPM Per SpO2; 90 - 95%  Continuous,   Status:  Canceled         06/30/24 0904 06/30/24 0904  Continuous Pulse Oximetry  Continuous,   Status:  Canceled         06/30/24 0904 06/30/24 0904  Advance Diet As Tolerated -  Until Discontinued,   Status:  Canceled         06/30/24 0904 06/30/24 0904  Notify MD if platelet count is less than 100,000, is less than 1/2 baseline, or if Hgb drops by more than 3mg/dl.  Until Discontinued,   Status:  Canceled         06/30/24 0904 06/30/24 0904  Notify MD of hypotension (SBP less than 95), bleeding, or dysrythmia and follow Sheath Removal Policy if needed.  Continuous,   Status:  Canceled         06/30/24 0904 06/30/24 0904  Verify Discontinuation of enoxaparin (LOVENOX) and / or heparin  Once         06/30/24 0904 06/30/24 0904  Keep Arterial Sheath in Place  Once         06/30/24 0904 06/30/24 0904  Apply / Maintain Pressure Bag to Arterial Sheath While In Place  Per Order Details,   Status:  Canceled        Comments: Apply / Maintain Pressure Bag to Arterial Sheath While In Place    06/30/24 0904 06/30/24 0904  Head of Bed: 15 degrees (or less); Activity Level: Strict Bed Rest; (May Log Roll); Total Bedrest Time? Other; Length of Time: indefinite (has IABP)  Per Order  Details,   Status:  Canceled         06/30/24 0904    06/30/24 0904  Keep Affected Extremity Straight While Sheath in Place  Per Order Details,   Status:  Canceled        Comments: Keep Affected Extremity Straight While Sheath in Place    06/30/24 0904    06/30/24 0904  Vital Signs  Per Order Details,   Status:  Canceled         06/30/24 0904    06/30/24 0904  Neurovascular Checks  Per Order Details,   Status:  Canceled         06/30/24 0904    06/30/24 0904  Sheath Site Assessment  Per Order Details,   Status:  Canceled         06/30/24 0904    06/30/24 0904  NPO Diet NPO Type: Strict NPO  Diet Effective Now,   Status:  Canceled         06/30/24 0904    06/30/24 0904  Inpatient Cardiothoracic Surgery Consult  STAT,   Status:  Canceled        Specialty:  Cardiothoracic Surgery  Provider:  Ronn Bermudez MD    06/30/24 0904    06/30/24 0904  Initiate & Follow Heparin Protocol  Continuous,   Status:  Canceled         06/30/24 0904    06/30/24 0904  Heparin Nomogram / Protocol Adjustment  Once         06/30/24 0904    06/30/24 0904  aPTT  STAT        Comments: Prior to Initial Heparin Bolus      06/30/24 0904    06/30/24 0903  Vital Signs  As Needed,   Status:  Canceled       06/30/24 0904    06/30/24 0903  Change site dressing  As Needed,   Status:  Canceled       06/30/24 0904    06/30/24 0903  acetaminophen (TYLENOL) tablet 650 mg  Every 4 Hours PRN,   Status:  Discontinued         06/30/24 0904    06/30/24 0903  aPTT  As Needed,   Status:  Canceled       06/30/24 0904    06/30/24 0903  RN To Release aPTT Order 6 Hours After Heparin Bolus & 6 Hours After Any Heparin Rate Change  As Needed,   Status:  Canceled       06/30/24 0904    06/30/24 0903  After 2 Consecutive Therapeutic aPTTs, Obtain aPTT Daily.  If a Rate Adjustment is Necessary, Resume Every 6 Hour aPTT Draws  As Needed,   Status:  Canceled       06/30/24 0904    06/30/24 0903  heparin (porcine) injection 3,770 Units  As Needed,   Status:  Discontinued          06/30/24 0904    06/30/24 0903  heparin (porcine) injection 1,880 Units  As Needed,   Status:  Discontinued         06/30/24 0904    06/30/24 0841  US Vein Mapping Bilateral  1 Time Imaging         06/30/24 0840    06/30/24 0840  Adult Transthoracic Echo Complete W/ Cont if Necessary Per Protocol  Once         06/30/24 0840    06/30/24 0840  US Carotid Bilateral  1 Time Imaging         06/30/24 0840    06/30/24 0817  POC Activated Clotting Time  PROCEDURE ONCE         07/01/24 0852    06/30/24 0811  Lipid Panel  Once         06/30/24 0811    06/30/24 0801  iopamidol (ISOVUE-370) 76 % injection 100 mL  Once in Imaging         06/30/24 0745    06/30/24 0738  iopamidol (ISOVUE-370) 76 % injection  Code / Trauma / Sedation Medication,   Status:  Discontinued         06/30/24 0738    06/30/24 0735  Inpatient Admission  Once         06/30/24 0738    06/30/24 0733  CT Angiogram Chest  1 Time Imaging         06/30/24 0732    06/30/24 0716  aPTT  Once         06/30/24 0716    06/30/24 0716  Protime-INR  Once         06/30/24 0716    06/30/24 0716  Familial Hypercholesterolemia Lipid Profile With Interpretation  Once,   Status:  Canceled         06/30/24 0716    06/30/24 0716  Fibrinogen  Once         06/30/24 0716    06/30/24 0716  Type & Screen  Once         06/30/24 0716    06/30/24 0716  Antithrombin III  Once         06/30/24 0716    06/30/24 0642  High Sensitivity Troponin T 2Hr  PROCEDURE ONCE         06/30/24 0525    06/30/24 0615  heparin (porcine) injection  Code / Trauma / Sedation Medication,   Status:  Discontinued         06/30/24 0615    06/30/24 0529  lidocaine (XYLOCAINE) 2% injection  Code / Trauma / Sedation Medication,   Status:  Discontinued         06/30/24 0530    06/30/24 0527  fentaNYL citrate (PF) (SUBLIMAZE) injection  Code / Trauma / Sedation Medication,   Status:  Discontinued         06/30/24 0527    06/30/24 0527  Midazolam HCl (PF) (VERSED) injection  Code / Trauma / Sedation Medication,    Status:  Discontinued         06/30/24 0527    06/30/24 0525  heparin infusion 2 units/mL in 0.9% NaCl  Code / Trauma / Sedation Medication,   Status:  Discontinued         06/30/24 0526    06/30/24 0525  heparin infusion 2 units/mL in 0.9% NaCl  Code / Trauma / Sedation Medication,   Status:  Discontinued         06/30/24 0525    06/30/24 0520  diphenhydrAMINE (BENADRYL) injection  Code / Trauma / Sedation Medication,   Status:  Discontinued         06/30/24 0615    06/30/24 0503  Cardiac Catheterization/Vascular Study  Once         06/30/24 0502    06/30/24 0457  nitroglycerin (TRIDIL) 200 mcg/ml infusion  Titrated,   Status:  Discontinued         06/30/24 0441    06/30/24 0448  XR Chest 1 View  1 Time Imaging         06/30/24 0447    06/30/24 0447  POC Glucose Once  PROCEDURE ONCE        Comments: Complete no more than 45 minutes prior to patient eating      06/30/24 0435    06/30/24 0432  nitroglycerin (NITROSTAT) SL tablet 0.4 mg  Every 5 Minutes PRN,   Status:  Discontinued         06/30/24 0432    06/30/24 0431  CBC & Differential  STAT         06/30/24 0430    06/30/24 0431  Basic Metabolic Panel  STAT         06/30/24 0430    06/30/24 0431  Protime-INR  STAT         06/30/24 0430    06/30/24 0431  CBC Auto Differential  PROCEDURE ONCE         06/30/24 0431    06/30/24 0426  Single High Sensitivity Troponin T  Once         06/30/24 0428    06/30/24 0425  Insert peripheral IV  Once,   Status:  Canceled         06/30/24 0428    06/30/24 0425  [place] Draw  Once         06/30/24 0428    06/30/24 0425  ECG 12 Lead Chest Pain  Once         06/30/24 0428    06/30/24 0425  Green Top (Gel)  PROCEDURE ONCE         06/30/24 0428    06/30/24 0425  Lavender Top  PROCEDURE ONCE         06/30/24 0428    06/30/24 0425  Red Top  PROCEDURE ONCE         06/30/24 0428    06/30/24 0425  Light Blue Top  PROCEDURE ONCE         06/30/24 0428    06/30/24 0424  sodium chloride 0.9 % flush 10 mL  As Needed,   Status:  Discontinued     "     06/30/24 0428    --  losartan (COZAAR) 25 MG tablet  Daily         06/30/24 0444    --  cetirizine (zyrTEC) 10 MG tablet  Daily,   Status:  Discontinued         06/30/24 1149    --  omeprazole (priLOSEC) 20 MG capsule  Daily         06/30/24 1149    --  rosuvastatin (CRESTOR) 10 MG tablet  Daily         06/30/24 1149    --  fluticasone (FLONASE) 50 MCG/ACT nasal spray  Daily,   Status:  Discontinued         06/30/24 1320    --  albuterol sulfate  (90 Base) MCG/ACT inhaler  Every 4 Hours PRN         06/30/24 1322    --  benzonatate (TESSALON) 200 MG capsule  3 Times Daily PRN         06/30/24 1322                     Operative/Procedure Notes (all)        Roderick Keller MD at 06/30/24 0794  Version 1 of 1         Please see procedural report under \"results\" tab.      Electronically signed by Roderick Keller MD at 06/30/24 0707       Physician Progress Notes (all)    No notes of this type exist for this encounter.          Consult Notes (all)        Ronn Bermudez MD at 06/30/24 1121          Cardiac Surgery Consultation    Referring Physician: Dr. Roderick Keller    Chief Complaint   Patient presents with    Chest Pain         Subjective     History of Present Illness  Mr. Schmidt is a 53-year-old male who presents with new onset chest pain, he presented to an outside hospital about 1 AM was transferred to our facility where he was diagnosed with STEMI taken emergently to the Cath Lab.  I was called by Dr. Keller while he was in the Cath Lab.  He has a history of bypass 19 years ago at [place] for which she had 3 vein grafts 1 to LAD 1 to diagonal in 1 to OM distribution.  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  Today he endorsed 2 weeks of on and off pain with activity but better when he rested.  Coronary angiography showed occluded vein grafts, moderate disease in the circumflex that was codominant and occluded LAD at its ostium.  There were faint left to left collaterals " "filling the LAD and diagonal.        Review of Systems     A complete review of systems was performed, is negative except stated above.    Past Medical History:   Diagnosis Date    Coronary artery disease     Hypertension      Past Surgical History:   Procedure Laterality Date    CARDIAC SURGERY       History reviewed. No pertinent family history.  Social History     Tobacco Use    Smoking status: Every Day     Current packs/day: 0.50     Average packs/day: 0.5 packs/day for 24.5 years (12.2 ttl pk-yrs)     Types: Cigarettes     Start date: 2000    Smokeless tobacco: Never   Vaping Use    Vaping status: Never Used   Substance Use Topics    Alcohol use: Never    Drug use: Never     Current Facility-Administered Medications   Medication Dose Route Frequency Provider Last Rate Last Admin    acetaminophen (TYLENOL) tablet 650 mg  650 mg Oral Q4H PRN Roderick Keller MD        heparin (porcine) injection 1,880 Units  30 Units/kg Intravenous PRN Roderick Keller MD        heparin (porcine) injection 3,770 Units  60 Units/kg Intravenous PRN Roderick Keller MD        heparin (porcine) injection 3,770 Units  60 Units/kg Intravenous Once Roderick Keller MD        heparin 53906 units/250 mL (100 units/mL) in 0.45 % NaCl infusion  12 Units/kg/hr Intravenous Titrated Roderick Keller MD        nitroglycerin (NITROSTAT) SL tablet 0.4 mg  0.4 mg Sublingual Q5 Min PRN Roderick Keller MD        nitroglycerin (TRIDIL) 200 mcg/ml infusion  20 mcg/min Intravenous Titrated Roderick Keller MD        sodium chloride 0.9 % flush 10 mL  10 mL Intravenous PRN Roderick Keller MD        sodium chloride 0.9 % infusion  100 mL/hr Intravenous Continuous Roderick Keller MD         Allergies:  Patient has no known allergies.    Objective      Vital Signs  Visit Vitals  /93   Pulse 61   Temp 97.5 °F (36.4 °C) (Oral)   Resp 18   Ht 160 cm (63\")   Wt 62.8 kg (138 lb 7.2 oz)   SpO2 98%   BMI 24.53 kg/m²         Physical " Exam  Constitutional:       General: He is not in acute distress.     Appearance: He is well-developed. He is not diaphoretic.   HENT:      Head: Normocephalic and atraumatic.      Right Ear: External ear normal.      Left Ear: External ear normal.   Eyes:      General:         Right eye: No discharge.         Left eye: No discharge.      Pupils: Pupils are equal, round, and reactive to light.   Neck:      Vascular: No JVD.      Trachea: No tracheal deviation.   Cardiovascular:      Rate and Rhythm: Normal rate and regular rhythm.      Heart sounds: Normal heart sounds. No murmur heard.     Comments: IABP in place right groin with 1:1 counterpulsation  Pulmonary:      Effort: Pulmonary effort is normal. No respiratory distress.      Breath sounds: Normal breath sounds. No stridor. No wheezing.   Abdominal:      General: There is no distension.      Palpations: Abdomen is soft.      Tenderness: There is no abdominal tenderness. There is no guarding.   Musculoskeletal:         General: No tenderness or deformity. Normal range of motion.      Cervical back: Normal range of motion and neck supple.   Skin:     General: Skin is warm and dry.      Capillary Refill: Capillary refill takes less than 2 seconds.      Coloration: Skin is not pale.      Findings: No erythema or rash.   Neurological:      Mental Status: He is alert and oriented to person, place, and time.      Motor: No abnormal muscle tone.      Coordination: Coordination normal.   Psychiatric:         Behavior: Behavior normal.         Thought Content: Thought content normal.         Judgment: Judgment normal.         Results Review:     WBC   Date Value Ref Range Status   06/30/2024 10.70 3.40 - 10.80 10*3/mm3 Final     RBC   Date Value Ref Range Status   06/30/2024 5.16 4.14 - 5.80 10*6/mm3 Final     Hemoglobin   Date Value Ref Range Status   06/30/2024 15.9 13.0 - 17.7 g/dL Final     Hematocrit   Date Value Ref Range Status   06/30/2024 47.8 37.5 - 51.0 %  Final     MCV   Date Value Ref Range Status   06/30/2024 92.6 79.0 - 97.0 fL Final     MCH   Date Value Ref Range Status   06/30/2024 30.8 26.6 - 33.0 pg Final     MCHC   Date Value Ref Range Status   06/30/2024 33.3 31.5 - 35.7 g/dL Final     RDW   Date Value Ref Range Status   06/30/2024 12.6 12.3 - 15.4 % Final     RDW-SD   Date Value Ref Range Status   06/30/2024 43.2 37.0 - 54.0 fl Final     MPV   Date Value Ref Range Status   06/30/2024 10.1 6.0 - 12.0 fL Final     Platelets   Date Value Ref Range Status   06/30/2024 195 140 - 450 10*3/mm3 Final     Neutrophil %   Date Value Ref Range Status   06/30/2024 67.3 42.7 - 76.0 % Final     Lymphocyte %   Date Value Ref Range Status   06/30/2024 25.5 19.6 - 45.3 % Final     Monocyte %   Date Value Ref Range Status   06/30/2024 5.2 5.0 - 12.0 % Final     Eosinophil %   Date Value Ref Range Status   06/30/2024 0.9 0.3 - 6.2 % Final     Basophil %   Date Value Ref Range Status   06/30/2024 0.5 0.0 - 1.5 % Final     Immature Grans %   Date Value Ref Range Status   06/30/2024 0.6 (H) 0.0 - 0.5 % Final     Neutrophils, Absolute   Date Value Ref Range Status   06/30/2024 7.20 (H) 1.70 - 7.00 10*3/mm3 Final     Lymphocytes, Absolute   Date Value Ref Range Status   06/30/2024 2.73 0.70 - 3.10 10*3/mm3 Final     Monocytes, Absolute   Date Value Ref Range Status   06/30/2024 0.56 0.10 - 0.90 10*3/mm3 Final     Eosinophils, Absolute   Date Value Ref Range Status   06/30/2024 0.10 0.00 - 0.40 10*3/mm3 Final     Basophils, Absolute   Date Value Ref Range Status   06/30/2024 0.05 0.00 - 0.20 10*3/mm3 Final     Immature Grans, Absolute   Date Value Ref Range Status   06/30/2024 0.06 (H) 0.00 - 0.05 10*3/mm3 Final     nRBC   Date Value Ref Range Status   06/30/2024 0.0 0.0 - 0.2 /100 WBC Final     Glucose   Date Value Ref Range Status   06/30/2024 128 (H) 65 - 99 mg/dL Final     Sodium   Date Value Ref Range Status   06/30/2024 142 136 - 145 mmol/L Final     Potassium   Date Value Ref  Range Status   06/30/2024 4.0 3.5 - 5.2 mmol/L Final     CO2   Date Value Ref Range Status   06/30/2024 27.0 22.0 - 29.0 mmol/L Final     Chloride   Date Value Ref Range Status   06/30/2024 105 98 - 107 mmol/L Final     Anion Gap   Date Value Ref Range Status   06/30/2024 10.0 5.0 - 15.0 mmol/L Final     Creatinine   Date Value Ref Range Status   06/30/2024 0.86 0.76 - 1.27 mg/dL Final     BUN   Date Value Ref Range Status   06/30/2024 17 6 - 20 mg/dL Final     BUN/Creatinine Ratio   Date Value Ref Range Status   06/30/2024 19.8 7.0 - 25.0 Final     Calcium   Date Value Ref Range Status   06/30/2024 8.5 (L) 8.6 - 10.5 mg/dL Final        I reviewed the patient's clinical results and discussed with patient.    CT Chest:  MEDIASTINUM, HEART AND VASCULAR STRUCTURES: There is atheromatous  calcification of the thoracic aorta and coronary arteries. There is a  focus of air within one of the coronary grafts seen on image 68 of  series 5. This graft is occluded more distally seen best on series 10.  There may also be a short graft extending to the right coronary artery  that appears occluded. There is no CT evidence of pulmonary embolus.  There is an aortic balloon pump within the descending thoracic aorta and  extending into the abdominal aorta. Heart size is upper limits of  normal. There are no enlarged hilar or mediastinal lymph nodes.     LUNGS: There is dependent atelectasis posteriorly. There is no dense  consolidation or pleural effusion.     UPPER ABDOMEN: There is mild thickening of the wall of the gallbladder.  There are no other acute findings in the upper abdomen.     BONES: Prior median sternotomy. Degenerative changes of the visualized  spine.        IMPRESSION:  1. Atheromatous calcification of the thoracic aorta and coronary  arteries. Prior heart bypass. There is a focus of air within one of the  bypass grafts image 68 of series 5. This is also seen on other  sequences. This graft appears to be occluded  proximal to the air bubble  on series 10. There may also be a short graft leading to the right  coronary artery that may be occluded.  2. No CT evidence of pulmonary embolus.  3. Aortic balloon pump within the descending thoracic aorta and  extending into the abdominal aorta.  4. Dependent atelectasis.  5. Thickening of the gallbladder wall is nonspecific.        The full report of this exam was immediately signed and available to the  emergency room. The patient is currently in the emergency room.     This report was signed and finalized on 6/30/2024 9:05 AM by Dr. Jules Hagen MD.    Cath:  Findings:    Hemodynamics:  -Opening pressure: 125/71, mean 82  -Left heart catheterization: , EDP 28. Ao: 130/82, mean 104.     Left ventriculography:  1. The contractility of the left ventricle is abnormal, with apical hypokinesis. Estimated ejection fraction 45-50%.  2. The left ventricle is normal in wall thickness and chamber size.  3. There is no significant mitral regurgitation.    Selective coronary angiography:   Dominance: Right    Left Main coronary artery: Large-caliber vessel that arises normally from the left cusp and bifurcates. There is diffuse tapering of the mid to distal vessel ipsilateral to the LAD origin, with severe calcification. Distally, this amounts to 50% stenosis.    Left anterior descending artery: 100% ostial chronic total occlusion. The proximal portion is visualized fluoroscopically with severe calcification. There are bridging collaterals that show faint, EARNEST-1 antegrade flow into the LAD and a diagonal branch. The diagonal branch has more contrast staining. There are other left to left collaterals that also helped to show visualization of the mid to distal LAD. The vessel appears to be medium in caliber and reasonable target for graft placement.     Left circumflex: Medium caliber vessel arising at a rather acute angle from the distal left main that is not dominant for posterior  "circulation, supplying 2 medium caliber obtuse marginal branches. The AV groove portion of the vessel has diffuse moderate calcification and diffuse mild disease. OM1 is free of significant disease. The AV groove portion beyond this has diffuse mild disease. There is a focal 70% stenosis at the ostium of the OM 2, then another eccentric 50% stenosis in its proximal portion.    Right coronary artery: Medium caliber vessel arising normally from the right cusp that is dominant for posterior circulation, supplying an acute marginal and a few small PL branches with no definitive PDA (of note, catheterization before surgery 2009 noted similar conclusions (i.e., no \"true PDA\" was seen. The proximal RCA has an eccentric 70-80% stenosis. The acute marginal branch has eccentric 50-60% stenosis proximally.    Bypass graft angiography:  -SVG to LAD: 100% thrombotic occlusion in its midportion. Review of operative report indicates that this vein graft also supplied an SVG to diagonal branch.  -SVG to OM: 100% occluded at its ostium.  -LANI: Arises normally and appears to have been dissected as presents of clips on fluoroscopy are noted, but does not traverse inferiorly enough to have anastomosis with any epicardial coronary vessel. Later, operative report was received which indicated the vessel had been dissected but then surgeon realize it was too small for use.  -LIMA: Arises normally from the left subclavian but appears atretic. There are no clips on the side of the chest. Subsequent review of the operative report noted the surgeon examined it and realized it had a long fibrous core making it atretic and chose not to use it.    Percutaneous coronary intervention: Given the above findings, it was suspected that a graft arising from the aorta and likely inserting to the diagonal and/or LAD was the acute culprit, with thrombotic occlusion. There was faint collateral filling via left to left collaterals allowing some " visualization of the LAD and diagonal bifurcation in the mid to distal LAD. There appeared to be some bridging collaterals from the left main into the LAD, but it did appear to be a chronic total occlusion. Given the lack of anterograde flow into the system, and suspected thrombotic occlusion of vein graft that had been supplying that, I attempted native vessel revascularization. Heparin bolus was administered. As such, a 6 Algerian XB 3.5 guiding catheter was used to engage the left main. I attempted to advance a whisper ES wire into the LAD but was unsuccessful so it was left in the circumflex. I then tried a Fielder XT wire which also could not penetrate the proximal cap of the ostial . At this point, I felt it best to have the patient evaluated for surgical revascularization and removed all equipment. Final angiography showed no change compared to pre-interventional attempts.     Estimated Blood Loss: <50mL  Specimens: None  Complications: None       Impression:  1. Native three vessel coronary artery disease.  2. Acute anterior myocardial infarction, as a result of thrombotic occlusion of a saphenous vein graft supplying an LAD and (by way of composite SVG attached to this one) a diagonal branch (known after reviewing operative report that was acquired during the latter phases of this case)  3. Some native left to left collaterals bilaterally visualization of the LAD and diagonal branch, preserving most of systolic function of the anterior wall, and likely preventing this current infarction from making the patient hemodynamically and electrically unstable  4. LVEF 45-50%, with apical hypokinesis  5. Elevated LVEDP  6. Successful placement of intra-aortic balloon pump (40 cc), to help maintain coronary perfusion (particularly through collaterals noted to the LAD/diagonal system) while patient is being evaluated for redo surgical revascularization  7. No availability of mammary arteries for conduit usage; the  LANI had been dissected but not used at time of original surgery, and LIMA was recognized as having been atretic at that time     Plan:   1. After discussion with Dr. Bermudez, patient will be transported for CTA of the chest and then to the ICU, where he will be evaluated for options regarding high risk redo surgical revascularization. It is acknowledged that available conduits to use may be very limited, though all of patient's saphenous vein grafts were taken from his left leg at time of original 3v CABG in '09 (SVG->LAD, SVG->diagonal, and SVG->OM).  2. If he can be taken for redo CABG, of course the primary goal would be to achieve restored antegrade flow to the anterior wall (LAD and diagonal). Vein grafts could also be used for anastomosis to OM 2 and distal RCA if possible.   3. Remain on strict bedrest with IABP 1:1  4. Remain on heparin infusion per ACS protocol  5. Initiate and maintain nitroglycerin infusion at 20 mcg/min for vasodilation        Roderick Keller MD     I personally reviewed images of following exams, the following is my interpretation:    CT Chest:  Previous cardiac surgery with Robicsek closure of sternum, it appears the RV and majority of heart is adhesed to the underside of left chest, graft seen and graft going to the LAD distribution is close to sternotomy wires    ECHO: LV function at least moderately reduced with hypo or akinesis of the anterior wall, normal movement of lateral and inferior wall no significant valvular disease  CATH: Left dominant or possibly slightly codominant coronary system with occluded LAD, all vein grafts are occluded, moderate disease in the circumflex with left to left collaterals filling a normal-appearing caliber LAD and diagonal        Assessment & Plan     Mr. Schmidt is a 53-year-old male who presents with STEMI.  He has a history of coronary bypass grafting x 3 with vein grafts only due to unusable DARNELL's.  His chest pains improved high-sensitivity troponin  was elevated to 300 our facility and his pain started approximately 12 hours ago.  He has been having 2 weeks of unstable anginal type symptoms.  I reviewed his imaging including his CT chest, his reentry sternotomy will be very high risk as I suspect there is extensive adhesions to the underside of the left chest wall.  At previous surgery per op note it is noted that there was bleeding causing reentry into the chest after closure with bleeding at the chest wall near the site.  Therefore I believe this would be high risk for redo sternotomy for bypass.  I am also concerned about quality of conduits given previous problems.  Currently he is hemodynamically stable we have a balloon pump in place to aid in perfusion through collaterals to his LAD.    Discussed the case extensively with Dr. Keller, I did call and discussed the case with Dr. Elise Marcos, Cardiac Surgeon at [facility] in [facility].  He is graciously agreed to take the patient in transfer if that is what the family wants.  There can perform more guided myocardial viability testing and if suspected viable myocardium consider high risk revascularization.    Thank you for trusting me with the care of Mr. Schmidt.  Please do not hesitate to call with any questions or concerns.    Ronn Bermudez MD  Cardiothoracic Surgeon            Electronically signed by Ronn Bermudez MD at 06/30/24 1133          Discharge Summary        Roderick Keller MD at 06/30/24 1350            [facility] HEART GROUP DISCHARGE    Date of Discharge:  6/30/2024    Discharge Diagnosis:   1.  Acute anterior STEMI, due to thrombotic occlusion of saphenous vein graft supplying LAD (and also composite SVG supplying diagonal branch)  2.  Tobacco abuse  3.  Essential hypertension    Presenting Problem/History of Present Illness  (From H&P):  53-year-old male with known CAD including three-vessel CABG in 2009 (followed thereafter with Dr. Morelos until he (in a few years ago), who  "presented to his local ER tonight complaining of ongoing chest discomfort.  For the last 2 weeks, he has described intermittent but self resolving episodes of substernal chest discomfort.  This morning around 3 AM, he notes the discomfort returned but did not go away as it been had been doing.  He describes it as a squeezing sensation and has had associated diaphoresis and shortness of breath.  He presented to his ER where initial ECG showed ischemic changes but no ST elevation.  He was given full dose aspirin Lovenox and transferred here for a \"non-STEMI\" is the initial troponin there was minimally elevated at 0.472 (range 0-0.033).  Upon arrival here, despite repeated doses of nitroglycerin, he was still having ongoing moderate degree of chest discomfort and his ECG looked different, so \"code STEMI\" was activated.     Upon arrival to the Cath Lab, he is still complaining of ongoing chest discomfort.    Hospital Course    Patient was brought directly to the Cath Lab.  Please see catheterization report for full details.  Of note, he informed me he had a prior three-vessel CABG in 2009, performed at [facility], though operative details were not available at the time we started the emergent case.  Locating and assimilating his coronary and bypass graft angiography proved rather difficult, but thankfully near the lateral portion of the catheter lab case, we were able to obtain a faxed report from [facility] including his original operative report from 2009.  These findings confirmed suspicions on catheterization; neither LIMA was used.  Apparently the right DARNELL was initially dissected but then felt to be too small it was never anastomosed to the LAD.  The LIMA was felt to be atretic with a long fibrous cord at the time by the surgeon, so was not used.  3 vein grafts were used, one of the LAD, 1 to a diagonal, and 1 to an OM branch.  Operative description mentioned a vein to vein conduit to the diagonal, suggesting to " me it was a composite off of the vein graft arising from the ascending aorta that inserted into the LAD.  This vein graft proved to have a thrombotic occlusion in the Cath Lab.  There were faint left to left collaterals showing EARNEST I flow into the LAD and diagonal distributions, and left ventriculography showed apical hypokinesis with an estimated LVEF of 45 to 50%.  I attempted to restore anterior wall flow through the native LAD, but it was 100% ostial chronic total occlusion with severe calcification.  I cannot advance a wire into this vessel despite numerous attempts.    The patient did remain hemodynamically and electrically stable throughout the case check felt like was undoubtedly due, at least in part, to the collaterals noted to the LAD and diagonal distributions.  I did urgently consult with our CT surgeon, Dr. Bermudez.  After we discussed the case, I placed an intra-aortic balloon pump to maintain augmented coronary perfusion, particularly through the aforementioned collaterals, to hopefully continue protection of the anterior wall during this infarction.  Patient was taken for CTA of the chest, which Dr. Bermudez reviewed and noted extensive adhesions underneath the chest wall.  He also reviewed the prior operative note and some mention of a reentry into the chest after initial closure because of bleeding near the chest wall site.  He therefore felt the patient be too high risk for redo sternotomy and had valid concerns regarding quality of conduits available.    In light of all the above, Dr. Bermudez did discuss the case with Dr. Oliver to at [facility] in [place].  Patient and family agreed to transfer as well.    Patient will be transferred furred from our CCU to [facility] to be evaluated for redo CABG, with IABP in place with 1:1 counterpulsation.  He has remained hemodynamically stable and rhythm has remained stable as well.  He is on heparin infusion as well as low-dose nitroglycerin infusion to  maintain vasodilatation, particularly of the collaterals.        Procedures Performed  Procedure(s):  Left Heart Cath  06/30 0733 Note By: Roderick Keller MD    Consults:   Consults       Date and Time Order Name Status Description    6/30/2024  9:04 AM Inpatient Cardiothoracic Surgery Consult              Pertinent Test Results:     Echo report pending    Initial high-sensitivity troponin upon arrival here it was 300, with repeat 5 hours later at 2333.  This is a high-sensitivity troponin T.  Initial metabolic profile normal, with creatinine 0.86.  Sodium 142 and potassium 4.0.  CBC with WBC 10.7, hemoglobin 15.9, platelets 195.    Type and screen: A+.      Condition on Discharge: Stable but guarded    Physical Exam at Discharge    Vital Signs  Temp:  [97.5 °F (36.4 °C)-98.2 °F (36.8 °C)] 97.5 °F (36.4 °C)  Heart Rate:  [61-85] 67  Resp:  [18] 18  BP: (120-150)/(82-96) 127/95    Physical Exam:  Vitals and nursing note reviewed.   Constitutional:       General: Not in acute distress.     Appearance: Acutely ill-appearing.   Neck:      Vascular: No JVD or JVR. JVD normal.   Pulmonary:      Effort: Pulmonary effort is normal.      Breath sounds: Normal breath sounds.   Cardiovascular:      Normal rate. Regular rhythm.      Murmurs: There is no murmur.      No gallop.  No rub.   Pulses:     Intact distal pulses.   Edema:     Peripheral edema absent.   Skin:     General: Skin is warm and dry.   Neurological:      Mental Status: Alert, oriented to person, place, and time and oriented to person, place and time.         Discharge Disposition: to [facility] ([facility] - [facility])      Current Facility-Administered Medications:     acetaminophen (TYLENOL) tablet 650 mg, 650 mg, Oral, Q4H PRN, Roderick Keller MD    heparin (porcine) injection 1,880 Units, 30 Units/kg, Intravenous, PRN, Roderick Keller MD    heparin (porcine) injection 3,770 Units, 60 Units/kg, Intravenous, PRN, Roderick Keller MD     heparin (porcine) injection 3,770 Units, 60 Units/kg, Intravenous, Once, Roderick Keller MD    heparin 59446 units/250 mL (100 units/mL) in 0.45 % NaCl infusion, 12 Units/kg/hr, Intravenous, Titrated, Roderick Keller MD    nitroglycerin (NITROSTAT) SL tablet 0.4 mg, 0.4 mg, Sublingual, Q5 Min PRN, Roderick Keller MD    nitroglycerin (TRIDIL) 200 mcg/ml infusion, 20 mcg/min, Intravenous, Titrated, Roderick Keller MD, Last Rate: 6 mL/hr at 06/30/24 0830, 20 mcg/min at 06/30/24 0830    sodium chloride 0.9 % flush 10 mL, 10 mL, Intravenous, PRN, Roderick Keller MD      Prior outpatient medications     Discharge Medications        ASK your doctor about these medications        Instructions Start Date   albuterol sulfate  (90 Base) MCG/ACT inhaler  Commonly known as: PROVENTIL HFA;VENTOLIN HFA;PROAIR HFA   1 puff, Inhalation, Every 4 Hours PRN      benzonatate 200 MG capsule  Commonly known as: TESSALON   200 mg, Oral, 3 Times Daily PRN      losartan 25 MG tablet  Commonly known as: COZAAR   25 mg, Oral, Daily      omeprazole 20 MG capsule  Commonly known as: priLOSEC   20 mg, Oral, Daily      rosuvastatin 10 MG tablet  Commonly known as: CRESTOR   10 mg, Oral, Daily               Discharge Diet: N.p.o.    Activity at Discharge: Currently on bedrest with IABP in place    Follow-up Appointments: to be determined after discharge from [facility]  No future appointments.      Test Results Pending at Discharge: echocardiogram   (Though images are being burned to disc which also included films today's cardiac catheterization)    I spent a total of 135 minutes in care for this patient today, excluding time spent performing cardiac catheterization this time does include consultation and conversations with surgery here, review of diagnostics, and coordinating care to transfer patient       Roderick Keller MD  06/30/24  13:50 CDT                Electronically signed by Roderick Keller MD at 06/30/24 9515         [facility] CATH LAB  [address]  [facility] [id number] [phone]              Cardiac Catheterization/Vascular Study    Accession Number: [id number]   Date of Study: 24   Ordering Provider: Roderick Keller MD   Clinical Indications: STEMI        Performing Physician  Performing Staff   Primary: Roderick Keller MD    Scrub Person: Caty Toscano RN   Documenter: Jenny Middleton RN   Invasive Technologist: Emily Fabian R.T.(R)   Invasive Nurse: Maurilio Olson RN         Procedures    Left Heart Cath        Patient Information    Patient Name  Paris Schmidt MRN  [id number] Legal Sex  Male  (Age)  1970 (53 y.o.)     Race Ethnicity Encounter Category    Not  or  Emergency        [facility] PACS Images     Show images for Cardiac Catheterization/Vascular Study         Printable Vessel Diagram    Printable Vessel Diagram       Physicians    Panel Physicians Referring Physician Case Authorizing Physician   Roderick Keller MD (Primary)             Pre Procedure Diagnosis    STEMI               Procedure Narrative    [image]  [facility]ART [facility]  Date of procedure: 2024     Procedures performed:     1.  Selective coronary angiography  2.  Bypass graft angiography   3.  Left heart catheterization  4. Left ventriculography  5.  Aortic root angiography  6.  Aortic arch angiography  7.  Unsuccessful attempt at (acute infarct) percutaneous coronary intervention to the left anterior descending artery  8.  Placement of intra-aortic balloon pump  9. Supervision of the administration of moderate sedation    Risk, Benefits, and Alternatives discussed with the patient and/or family.  Plan is for moderate sedation, and the patient agrees to proceed with the procedure.  An immediate assessment was done prior to the administration of moderate sedation     Indication: Acute anterior STEMI  Premedication: Versed, Fentanyl  Contrast: Isovue 370 - 320 ml to  "patient  Radiation: Flouro time= 18.8 minutes. Air Kerma= 588 mGy  Catheters: 6Fr JL4, 6Fr JR4, 6 Ugandan multipurpose, 6Fr angled pigtail  Guiding catheter: 6 Ugandan XB 3.5  PCI Hardware: .014\" whisper ES, Fielder XT guide wires    Procedural details:  The patient was brought to the cath lab and prepped and draped in the usual fashion. A Seldinger technique was used to place a 6 Fr sheath in the right common femoral artery. A 6 Fr JL4 catheter was used to engage the left main coronary artery for left system angiography. That was exchanged for a 6 Fr JR4 catheter which was engaged in the ostium of the right coronary artery for right system angiography.  This was then withdrawn into the ascending aorta and used to engage what appeared to be 1 vein graft.  There were no other markers or clip suggesting placement of others, but the catheter was manipulated throughout the right and not able to engage anything else.  There did appear to be clips on the right hemithorax, so the JR4 was advanced out over a wire and ultimately used to engage the right internal mammary artery.  Angiography of this was performed, then the JR catheter was withdrawn into the aortic arch.  It was then advanced out over a wire into the left subclavian artery and used to perform angiography of the LIMA.  At this point, anatomic details regarding his prior CABG in 2009 were still not normal. Staff was working vigilantly and trying to obtain these records emergently from [place].  The patient had stated it was a three-vessel CABG, and my conclusion at this point was that neither DARNELL had been used and then only 1 vein graft had been located, so a 6 Ugandan angled pigtail catheter was then.  Initially this was used to cross aortic valve and perform left ventriculography as well as left heart catheterization, assessing ventricular pressures and pullback gradient across the valve. Then, in light of the uncertain anatomy, numerous different aortic root " injections were performed at different levels, and ultimately the aortic arch was injected as well (all of these were done with power injected contrast).  The initial read injection did show what appeared to be a stump on the contralateral side of the aortic wall from the vein graft that had previously been engaged.  There did not appear to be any anterograde flow from this to the heart, but to confirm, a 6 Faroese multipurpose catheter was inserted and used to engage this.  Angiography was repeated in orthogonal views and confirmed lack of any antegrade flow to the heart.    At this point, though the ostial LAD appeared to be a chronic total occlusion, I suspected an acute graft occlusion to the anterior wall, so I attempted to perform percutaneous revascularization of the LAD as further detailed below.  After this was unsuccessful, I placed an urgent consultation and had a call with Dr. Bermudez, CT surgery.  At this time, we also received the operative note confirming the patient's graft anatomy.  Apparently the right internal mammary had been dissected but proved to be too small for use so was never anastomosed to any epicardial coronary vessel.  The LIMA was examined by the surgeon and felt to have a long fibrous cord and was too atretic TAVR even dissected.  Thus, pain was harvested from the left leg and used to construct SVG to LAD, SVG to diagonal, and SVG to OM.  Of note, the operative report indicated that the SVG to diagonal was a venovenous conduit from the SVG to LAD.  Therefore, I concluded that the thrombotic occlusion resulting in this presentation was of the vein graft supplying the LAD and, by way of composite graft, the diagonal as well.  After discussing with Dr. Bermudez, and realizing options for redo CABG will be quite limited given lack of available conduits other than, potassium, another, we agreed to place an intra-aortic balloon pump maintain coronary perfusion, particularly through the collaterals  that are allowing faint filling of the LAD and diagonal at present.    As such, all equipment was removed through the 6 Divehi sheath and femoral angiogram was performed.  Vessel appears sizable enough to accommodate the balloon pump, so the 6 Divehi sheath was removed over a wire and exchanged for the 7.5 Divehi sheath with the balloon pump.  40 cc IABP was then inserted over a wire and positioned just distal to the subclavian arterial origin.  Device was prepped in the usual fashion, and 1:1 counterpulsation was initiated.    Patient remained hemodynamically and electrically stable throughout the procedure.  He reported ongoing chest discomfort at its conclusion, though admitted it was improved compared to before hand.  He was transported out of the Cath Lab to the CT scanner for a CT of the chest, as requested by Dr. Bermudez, and then was transported to the ICU.    I supervised the administration of conscious sedation by nursing staff throughout the case.  First dose was given at 528 and the end of my face-to-face encounter was at 731, accounting for a total of 123 minutes of supervision.  During the case, continuous pulse oximetry, heart rate, blood pressure, and patient status were monitored.     Findings:    Hemodynamics:  -Opening pressure: 125/71, mean 82  -Left heart catheterization: , EDP 28.  Ao: 130/82, mean 104.      Left ventriculography:  1. The contractility of the left ventricle is abnormal, with apical hypokinesis.  Estimated ejection fraction 45-50%.  2. The left ventricle is normal in wall thickness and chamber size.  3. There is no significant mitral regurgitation.    Selective coronary angiography:   Dominance: Right    Left Main coronary artery: Large-caliber vessel that arises normally from the left cusp and bifurcates.   There is diffuse tapering of the mid to distal vessel ipsilateral to the LAD origin, with severe calcification.  Distally, this amounts to 50% stenosis.    Left anterior  "descending artery: 100% ostial chronic total occlusion.  The proximal portion is visualized fluoroscopically with severe calcification.  There are bridging collaterals that show faint, EARNEST-1 antegrade flow into the LAD and a diagonal branch.  The diagonal branch has more contrast staining.  There are other left to left collaterals that also helped to show visualization of the mid to distal LAD.  The vessel appears to be medium in caliber and reasonable target for graft placement.      Left circumflex: Medium caliber vessel arising at a rather acute angle from the distal left main that is not dominant for posterior circulation, supplying 2 medium caliber obtuse marginal branches.  The AV groove portion of the vessel has diffuse moderate calcification and diffuse mild disease.  OM1 is free of significant disease.  The AV groove portion beyond this has diffuse mild disease.  There is a focal 70% stenosis at the ostium of the OM 2, then another eccentric 50% stenosis in its proximal portion.    Right coronary artery: Medium caliber vessel arising normally from the right cusp that is dominant for posterior circulation, supplying an acute marginal and a few small PL branches with no definitive PDA (of note, catheterization before surgery 2009 noted similar conclusions (i.e., no \"true PDA\" was seen.  The proximal RCA has an eccentric 70-80% stenosis.  The acute marginal branch has eccentric 50-60% stenosis proximally.    Bypass graft angiography:  -SVG to LAD: 100% thrombotic occlusion in its midportion.  Review of operative report indicates that this vein graft also supplied an SVG to diagonal branch.  -SVG to OM: 100% occluded at its ostium.  -LANI: Arises normally and appears to have been dissected as presents of clips on fluoroscopy are noted, but does not traverse inferiorly enough to have anastomosis with any epicardial coronary vessel.  Later, operative report was received which indicated the vessel had been " dissected but then surgeon realize it was too small for use.  -LIMA: Arises normally from the left subclavian but appears atretic.  There are no clips on the side of the chest.  Subsequent review of the operative report noted the surgeon examined it and realized it had a long fibrous core making it atretic and chose not to use it.    Percutaneous coronary intervention: Given the above findings, it was suspected that a graft arising from the aorta and likely inserting to the diagonal and/or LAD was the acute culprit, with thrombotic occlusion.  There was faint collateral filling via left to left collaterals allowing some visualization of the LAD and diagonal bifurcation in the mid to distal LAD.  There appeared to be some bridging collaterals from the left main into the LAD, but it did appear to be a chronic total occlusion.  Given the lack of anterograde flow into the system, and suspected thrombotic occlusion of vein graft that had been supplying that, I attempted native vessel revascularization.  Heparin bolus was administered.  As such, a 6 Nauruan XB 3.5 guiding catheter was used to engage the left main.  I attempted to advance a whisper ES wire into the LAD but was unsuccessful so it was left in the circumflex.  I then tried a Fielder XT wire which also could not penetrate the proximal cap of the ostial .  At this point, I felt it best to have the patient evaluated for surgical revascularization and removed all equipment.  Final angiography showed no change compared to pre-interventional attempts.      Estimated Blood Loss: <50mL  Specimens: None  Complications: None       Impression:  1.  Native three vessel coronary artery disease.  2.  Acute anterior myocardial infarction, as a result of thrombotic occlusion of a saphenous vein graft supplying an LAD and (by way of composite SVG attached to this one) a diagonal branch (known after reviewing operative report that was acquired during the latter phases of  this case)  3.  Some native left to left collaterals bilaterally visualization of the LAD and diagonal branch, preserving most of systolic function of the anterior wall, and likely preventing this current infarction from making the patient hemodynamically and electrically unstable  4.  LVEF 45-50%, with apical hypokinesis  5.  Elevated LVEDP  6.  Successful placement of intra-aortic balloon pump (40 cc), to help maintain coronary perfusion (particularly through collaterals noted to the LAD/diagonal system) while patient is being evaluated for redo surgical revascularization  7.  No availability of mammary arteries for conduit usage; the LANI had been dissected but not used at time of original surgery, and LIMA was recognized as having been atretic at that time     Plan:   1.  After discussion with Dr. Bermudez, patient will be transported for CTA of the chest and then to the ICU, where he will be evaluated for options regarding high risk redo surgical revascularization.  It is acknowledged that available conduits to use may be very limited, though all of patient's saphenous vein grafts were taken from his left leg at time of original 3v CABG in '09 (SVG->LAD, SVG->diagonal, and SVG->OM).  2.  If he can be taken for redo CABG, of course the primary goal would be to achieve restored antegrade flow to the anterior wall (LAD and diagonal).  Vein grafts could also be used for anastomosis to OM 2 and distal RCA if possible.   3.  Remain on strict bedrest with IABP 1:1  4.  Remain on heparin infusion per ACS protocol  5.  Initiate and maintain nitroglycerin infusion at 20 mcg/min for vasodilation        Roderick Keller MD       Time Under Physician Observation    Name Panel Role Time Period   Roderick Keller MD Panel 1 Primary 6/30/2024 0528 - 6/30/2024 0731      Total Sedation Time    Moderate sedation event time was not documented.                     Vessel Access    A 6 fr sheath was successfully inserted into the right  "femoral artery.           Sheath Disposition    Suture was used to secure the sheath post procedure. Transparent Dressing was used to secure the sheath post procedure.  Sheath Left Intact after the procedure.  Pressure Bag was used to stabalize the sheath post procedure.                          Radiation    Time Event Details User   7:21 AM Yulia Cross RN - In Type: Other DC   7:35 AM Radiation Tracking Panel 1: Roderick Keller MD  Cumulative Air Kerma (mGy) = 588.000; Fluoro Time (min) = 18.8; DAP (mGy-cm2) = 11861.000 DC   8:24 AM Yulia Cross RN - Out Type: Other DC        Total Contrast        Administrations occurring from 0437 to 0806 on 06/30/24:   Medication Name Total Dose   iopamidol (ISOVUE-370) 76 % injection 400 mL         Patient Hx Of Height, Weight, and Vitals    Height Weight BSA (Calculated - sq m) BMI (Calculated) Retired BMI (kg/m2) Pulse BP   160 cm (63\") 62.8 kg (138 lb 7.2 oz) 1.65 sq meters 24.5  71 133/91         Medications  (Filter: Administrations occurring from 0000 to 0904 on 06/30/24)   important  Continuous medications are totaled by the amount administered until 06/30/24 0904.       heparin infusion 2 units/mL in 0.9% NaCl (mL)   Total volume: 1,000 mL  Date/Time Rate/Dose/Volume Action    06/30/24 0525 1,000 mL Given      heparin infusion 2 units/mL in 0.9% NaCl (mL)   Total volume: 500 mL  Date/Time Rate/Dose/Volume Action    06/30/24 0525 500 mL Given      Midazolam HCl (PF) (VERSED) injection (mg)   Total dose: 3 mg  Date/Time Rate/Dose/Volume Action    06/30/24 0527 2 mg Given    0709 1 mg Given      fentaNYL citrate (PF) (SUBLIMAZE) injection (mcg)   Total dose: 75 mcg  Date/Time Rate/Dose/Volume Action    06/30/24 0527 25 mcg Given    0713 25 mcg Given    0744 25 mcg Given      lidocaine (XYLOCAINE) 2% injection (mL)   Total volume: 10 mL  Date/Time Rate/Dose/Volume Action    06/30/24 0529 10 mL Given      heparin (porcine) injection (Units)   Total dose: 7,000 Units  Date/Time " Rate/Dose/Volume Action    06/30/24 0615 4,000 Units Given    0720 3,000 Units Given      diphenhydrAMINE (BENADRYL) injection (mg)   Total dose: 50 mg  Date/Time Rate/Dose/Volume Action    06/30/24 0520 50 mg Given      iopamidol (ISOVUE-370) 76 % injection (mL)   Total volume: 400 mL  Date/Time Rate/Dose/Volume Action    06/30/24 0738 400 mL Given      iopamidol (ISOVUE-370) 76 % injection 100 mL (mL)   Total volume: 100 mL Dosing weight: 61.7  Date/Time Rate/Dose/Volume Action    06/30/24 0825 100 mL Given      sodium chloride 0.9 % infusion (mL/hr)   Total volume: 0 mL* Dosing weight: 61.7  *Administration not included in total  Date/Time Rate/Dose/Volume Action    06/30/24 0830 *100 mL/hr Currently Infusing      nitroglycerin (TRIDIL) 200 mcg/ml infusion (mcg/min)   Total dose: 12,525 mcg Dosing weight: 61.7  Date/Time Rate/Dose/Volume Action    06/30/24 0445 20 mcg/min - 6 mL/hr New Bag    0450 25 mcg/min - 7.5 mL/hr Rate/Dose Change    0455 30 mcg/min - 9 mL/hr Rate/Dose Change    0500 40 mcg/min - 12 mL/hr Rate/Dose Change    0505 50 mcg/min - 15 mL/hr Rate/Dose Change      nitroglycerin (TRIDIL) 200 mcg/ml infusion (mcg/min)   Total dose: 0 mcg* Dosing weight: 61.7  *Administration not included in total  Date/Time Rate/Dose/Volume Action    06/30/24 0830 *20 mcg/min - 6 mL/hr Currently Infusing        Implants    No implant documentation for this case.       Supplies    Name ID Temporary Type Charge Code Description Charge Code Quantity   SOL IRR NACL 0.9PCT BT 1000ML 650 No Supply   1   DEV TORQ GW HOT/PINK 3509 No Supply HC OR SUPPLY SHELL 272/ 958699 1   CATH DIAG SUPERTORQUE PLS MPA1 6F 100CM 4221 No Diagnostic Catheter HC OR SUPPLY SHELL 272/NO CPT 851746 1   SOLIDIFIER LIQUI LOC PLUS 2000CC 4423 No Supply   1   KT VLV HEMO MAP ACC PLS LG/BORE MTL/INTRO 4529 No Supply HC OR SUPPLY SHELL 272/ 036483 1   CATH DIAG INFINITI M/ PK 6FR 4583 No Diagnostic Catheter HC OR SUPPLY SHELL 272/NO CPT  174064 1   KT HEART MANIFLD LT CUST WBH 5834 No Supply HC OR SUPPLY SHELL 272/NO CPT 869516 1   GW HITORQUE/WHISPER/ES STR .014 [id number] 03998 No Guidewire HC OR SUPPLY SHELL 272/ 240378 1   GW PTCA ASAHI FIELDER XT [id number] 38556 No Guidewire HC OR SUPPLY SHELL 272/ 188729 1   IMMOB KN 3PNL DLX CANVS 19IN TALON 28623 No Supply HC OR SUPPLY SHELL 274/ 106907 1   BALN IAB SENSATION PLS F/O 40CC 7.5F 15CM SYS 37326 No Cardiac Assist Device HC OR SUPPLY SHELL 272/NO CPT 211816 1   CARTRDG LOGICAL TRANSD PRESS W/2STPCK 02919 No Supply   1   CATH GUIDE VISTABRITE XB3.5 6F .07IN 12120 No Guide Catheter HC OR SUPPLY SHELL 278/ 549998 1   SHEATH INTRO PINN CORNRY .038 6F10CM 01978 No Supply HC OR SUPPLY SHELL 272/ 877775 1   DEV INFL ENCORE 04527 No Supply HC OR SUPPLY SHELL 272/NO CPT 149254 1   PK CATH CARD 30 CA/4 996557 No Supply   1   GW PTCA PROWATER STR .014IN 180CM 904914 No Guidewire HC OR SUPPLY SHELL 272/ 753057 1   ELECTRD PAD DEFIB RADOLCNT M/FUNC PHYSIOCONTRL CONN/LD/IN A/ 097795 No Supply   1   SOLITARIO NASL ETCO2 LO/YOLANDE A/ 899663 No Supply   1       Signed    Electronically signed by Roderick Keller MD on 6/30/24 at 0904 T     Cardiac Catheterization/Vascular Study  Order: [id number]  Status: Final result      Result Care Coordination      Patient Communication     Not Released  Not seen              Link to Procedure Log    Procedure Log        Order-Level Documents:                   Results Routing Tracking    View Results Routing Information     Order Report     Order Details

## 2024-07-04 LAB
BH BB BLOOD EXPIRATION DATE: NORMAL
BH BB BLOOD EXPIRATION DATE: NORMAL
BH BB BLOOD TYPE BARCODE: 5100
BH BB BLOOD TYPE BARCODE: 9500
BH BB DISPENSE STATUS: NORMAL
BH BB DISPENSE STATUS: NORMAL
BH BB PRODUCT CODE: NORMAL
BH BB PRODUCT CODE: NORMAL
BH BB UNIT NUMBER: NORMAL
BH BB UNIT NUMBER: NORMAL
CROSSMATCH INTERPRETATION: NORMAL
CROSSMATCH INTERPRETATION: NORMAL
UNIT  ABO: NORMAL
UNIT  ABO: NORMAL
UNIT  RH: NORMAL
UNIT  RH: NORMAL

## 2024-07-09 PROCEDURE — 86870 RBC ANTIBODY IDENTIFICATION: CPT

## 2024-07-17 ENCOUNTER — HOSPITAL ENCOUNTER (EMERGENCY)
Facility: HOSPITAL | Age: 54
Discharge: HOME | End: 2024-07-17
Attending: STUDENT IN AN ORGANIZED HEALTH CARE EDUCATION/TRAINING PROGRAM
Payer: COMMERCIAL

## 2024-07-17 ENCOUNTER — APPOINTMENT (OUTPATIENT)
Dept: GENERAL RADIOLOGY | Facility: HOSPITAL | Age: 54
End: 2024-07-17
Payer: COMMERCIAL

## 2024-07-17 VITALS
HEART RATE: 72 BPM | DIASTOLIC BLOOD PRESSURE: 68 MMHG | WEIGHT: 138 LBS | HEIGHT: 63 IN | SYSTOLIC BLOOD PRESSURE: 102 MMHG | TEMPERATURE: 97.8 F | RESPIRATION RATE: 14 BRPM | OXYGEN SATURATION: 99 % | BODY MASS INDEX: 24.45 KG/M2

## 2024-07-17 DIAGNOSIS — R06.00 DYSPNEA, UNSPECIFIED TYPE: Primary | ICD-10-CM

## 2024-07-17 LAB
ANION GAP SERPL CALCULATED.3IONS-SCNC: 12 MMOL/L (ref 5–15)
BASOPHILS # BLD AUTO: 0.04 10*3/MM3 (ref 0–0.2)
BASOPHILS NFR BLD AUTO: 0.3 % (ref 0–1.5)
BUN SERPL-MCNC: 13 MG/DL (ref 6–20)
BUN/CREAT SERPL: 16.7 (ref 7–25)
CALCIUM SPEC-SCNC: 9 MG/DL (ref 8.6–10.5)
CHLORIDE SERPL-SCNC: 102 MMOL/L (ref 98–107)
CO2 SERPL-SCNC: 23 MMOL/L (ref 22–29)
CREAT SERPL-MCNC: 0.78 MG/DL (ref 0.76–1.27)
DEPRECATED RDW RBC AUTO: 52.6 FL (ref 37–54)
EGFRCR SERPLBLD CKD-EPI 2021: 106 ML/MIN/1.73
EOSINOPHIL # BLD AUTO: 0.19 10*3/MM3 (ref 0–0.4)
EOSINOPHIL NFR BLD AUTO: 1.5 % (ref 0.3–6.2)
ERYTHROCYTE [DISTWIDTH] IN BLOOD BY AUTOMATED COUNT: 15.6 % (ref 12.3–15.4)
GEN 5 2HR TROPONIN T REFLEX: 53 NG/L
GLUCOSE SERPL-MCNC: 90 MG/DL (ref 65–99)
HCT VFR BLD AUTO: 31.2 % (ref 37.5–51)
HGB BLD-MCNC: 9.8 G/DL (ref 13–17.7)
HOLD SPECIMEN: NORMAL
HOLD SPECIMEN: NORMAL
IMM GRANULOCYTES # BLD AUTO: 0.17 10*3/MM3 (ref 0–0.05)
IMM GRANULOCYTES NFR BLD AUTO: 1.3 % (ref 0–0.5)
LYMPHOCYTES # BLD AUTO: 1.93 10*3/MM3 (ref 0.7–3.1)
LYMPHOCYTES NFR BLD AUTO: 14.9 % (ref 19.6–45.3)
MAGNESIUM SERPL-MCNC: 2.4 MG/DL (ref 1.6–2.6)
MCH RBC QN AUTO: 29.8 PG (ref 26.6–33)
MCHC RBC AUTO-ENTMCNC: 31.4 G/DL (ref 31.5–35.7)
MCV RBC AUTO: 94.8 FL (ref 79–97)
MONOCYTES # BLD AUTO: 1.73 10*3/MM3 (ref 0.1–0.9)
MONOCYTES NFR BLD AUTO: 13.3 % (ref 5–12)
NEUTROPHILS NFR BLD AUTO: 68.7 % (ref 42.7–76)
NEUTROPHILS NFR BLD AUTO: 8.93 10*3/MM3 (ref 1.7–7)
NRBC BLD AUTO-RTO: 0 /100 WBC (ref 0–0.2)
PLATELET # BLD AUTO: 362 10*3/MM3 (ref 140–450)
PMV BLD AUTO: 9.9 FL (ref 6–12)
POTASSIUM SERPL-SCNC: 4.5 MMOL/L (ref 3.5–5.2)
RBC # BLD AUTO: 3.29 10*6/MM3 (ref 4.14–5.8)
SODIUM SERPL-SCNC: 137 MMOL/L (ref 136–145)
TROPONIN T DELTA: -6 NG/L
TROPONIN T SERPL HS-MCNC: 59 NG/L
WBC NRBC COR # BLD AUTO: 12.99 10*3/MM3 (ref 3.4–10.8)
WHOLE BLOOD HOLD COAG: NORMAL
WHOLE BLOOD HOLD SPECIMEN: NORMAL

## 2024-07-17 PROCEDURE — 84484 ASSAY OF TROPONIN QUANT: CPT | Performed by: STUDENT IN AN ORGANIZED HEALTH CARE EDUCATION/TRAINING PROGRAM

## 2024-07-17 PROCEDURE — 99284 EMERGENCY DEPT VISIT MOD MDM: CPT

## 2024-07-17 PROCEDURE — 36415 COLL VENOUS BLD VENIPUNCTURE: CPT

## 2024-07-17 PROCEDURE — 93005 ELECTROCARDIOGRAM TRACING: CPT | Performed by: STUDENT IN AN ORGANIZED HEALTH CARE EDUCATION/TRAINING PROGRAM

## 2024-07-17 PROCEDURE — 71046 X-RAY EXAM CHEST 2 VIEWS: CPT

## 2024-07-17 PROCEDURE — 93005 ELECTROCARDIOGRAM TRACING: CPT

## 2024-07-17 PROCEDURE — 85025 COMPLETE CBC W/AUTO DIFF WBC: CPT | Performed by: STUDENT IN AN ORGANIZED HEALTH CARE EDUCATION/TRAINING PROGRAM

## 2024-07-17 PROCEDURE — 83735 ASSAY OF MAGNESIUM: CPT | Performed by: STUDENT IN AN ORGANIZED HEALTH CARE EDUCATION/TRAINING PROGRAM

## 2024-07-17 PROCEDURE — 80048 BASIC METABOLIC PNL TOTAL CA: CPT | Performed by: STUDENT IN AN ORGANIZED HEALTH CARE EDUCATION/TRAINING PROGRAM

## 2024-07-17 RX ADMIN — TICAGRELOR 90 MG: 90 TABLET ORAL at 14:14

## 2024-07-17 NOTE — ED PROVIDER NOTES
Subjective   History of Present Illness  Patient presents due to trouble breathing.  Was recently seen here for STEMI, transferred to [place], had a redo cardiac bypass.  Was discharged home 5 days ago.  Just got his Brilinta prescription yesterday; took a dose at 3 PM and then at 9 PM.  After 9 PM dose about 10 minutes later developed shortness of breath.  No chest pain.  No lightheadedness syncope or diaphoresis.  No fevers abdominal pain nausea or vomiting.  Feels like he has been recovering overall well.  The shortness of breath is now gone.  He is concerned that Brilinta caused the shortness of breath so he presents for evaluation.  Reports taking 90 mg twice a day.  No associated rash.    Review of Systems   Constitutional:  Negative for chills and fever.   Respiratory:  Positive for shortness of breath. Negative for cough.    Cardiovascular:  Negative for chest pain and palpitations.   Gastrointestinal:  Negative for abdominal pain and vomiting.   Genitourinary:  Negative for difficulty urinating and dysuria.   Neurological:  Negative for syncope and light-headedness.       Past Medical History:   Diagnosis Date    Coronary artery disease     Hypertension        No Known Allergies    Past Surgical History:   Procedure Laterality Date    CARDIAC CATHETERIZATION N/A 6/30/2024    Procedure: Left Heart Cath;  Surgeon: Roderick Keller MD;  Location: [facility] CATH INVASIVE LOCATION;  Service: Cardiovascular;  Laterality: N/A;    CARDIAC SURGERY         History reviewed. No pertinent family history.    Social History     Socioeconomic History    Marital status:    Tobacco Use    Smoking status: Every Day     Current packs/day: 0.50     Average packs/day: 0.5 packs/day for 24.5 years (12.3 ttl pk-yrs)     Types: Cigarettes     Start date: 2000    Smokeless tobacco: Never   Vaping Use    Vaping status: Never Used   Substance and Sexual Activity    Alcohol use: Never    Drug use: Never    Sexual activity:  Defer           Objective   Physical Exam  Vitals reviewed.   Constitutional:       General: He is not in acute distress.  HENT:      Head: Normocephalic and atraumatic.   Eyes:      Extraocular Movements: Extraocular movements intact.      Conjunctiva/sclera: Conjunctivae normal.   Cardiovascular:      Pulses: Normal pulses.      Heart sounds: Normal heart sounds.   Pulmonary:      Effort: Pulmonary effort is normal. No respiratory distress.      Breath sounds: Normal breath sounds. No wheezing.   Abdominal:      General: Abdomen is flat. There is no distension.      Palpations: Abdomen is soft.      Tenderness: There is no abdominal tenderness. There is no guarding.   Musculoskeletal:         General: No swelling or tenderness.      Cervical back: Normal range of motion and neck supple.      Right lower leg: No edema.      Left lower leg: No edema.   Skin:     General: Skin is warm and dry.      Comments: Surgical sites are clean, healing well, well-approximated, no drainage erythema warmth or induration.   Neurological:      General: No focal deficit present.      Mental Status: He is alert. Mental status is at baseline.   Psychiatric:         Behavior: Behavior normal.         Thought Content: Thought content normal.         Procedures           ED Course  ED Course as of 07/17/24 1752   Wed Jul 17, 2024   1505 Pt stable. Repeat Tn pending.  [AS]   1549 Tn downtrending appropriately no acute signs/sx ACS will obtain repeat ECG. [AS]      ED Course User Index  [AS] Bret Horowitz MD                                             Medical Decision Making  Problems Addressed:  Dyspnea, unspecified type: complicated acute illness or injury    Amount and/or Complexity of Data Reviewed  Labs: ordered.  Radiology: ordered.  ECG/medicine tests: ordered.    Risk  Prescription drug management.      Paris Schmidt is a 53 y.o. male with PMH above who presents to the Emergency Department with dyspnea.  Reassuring exam  without evidence of fluid overload, unlikely that a graft site is acutely obstructed although he has been poorly compliant with his Brilinta given that his EKG does not show STEMI, does have anterior changes which were present previously but are improved, he has no chest pain, resting comfortably in the room, not clinically ill-appearing.  No evidence of fluid overload or acute heart failure.  He is in fact asymptomatic at this time, normal radial pulses, normal heart sounds, no evidence of effusion or tamponade.  Will administer Brilinta in the ER since this is highly important for maintaining graft patency and monitor for any signs of adverse drug reaction.    ED Course:   -Repeat EKG looks similar.  Patient feels asymptomatic on reassessment.  No shortness of breath or any symptoms during his entire stay in the ER.  Tolerated lunch without difficulty.  Reinforced in the importance of taking his medication on schedule and the risk of graft thrombosis if he does not.  No signs of postoperative infection, new onset heart failure, or ACS.  He will be discharged with plans for close outpatient follow-up, return precautions were reinforced.      Final diagnosis: Shortness of breath    All questions answered. Patient/family was understanding and in agreement with today's assessment and plan. The patient was monitored during their stay in the ED and dispositioned without acute event.    Electronically signed by:  Bret Horowitz MD 7/17/2024 17:52 CDT      Note: Dragon medical dictation software was used in the creation of this note.        Final diagnoses:   Dyspnea, unspecified type       ED Disposition  ED Disposition       ED Disposition   Discharge    Condition   Stable    Comment   --               Aman, Skinny, APRN  [address] [place] [id number] [phone]               Medication List      No changes were made to your prescriptions during this visit.            Bret Horowitz MD  07/17/24  1757

## 2024-07-17 NOTE — DISCHARGE INSTRUCTIONS
It is highly important that you continue all your medicines as prescribed.  Come back to the ER immediately for any chest pain shortness of breath or passing out or other emergency symptoms.  Otherwise please follow-up with your cardiology doctor as soon as possible

## 2024-07-18 LAB
QT INTERVAL: 416 MS
QT INTERVAL: 432 MS
QTC INTERVAL: 461 MS
QTC INTERVAL: 466 MS

## (undated) DEVICE — 6F .070 XB 3.5 100CM: Brand: VISTA BRITE TIP

## (undated) DEVICE — SOL IRR NACL 0.9PCT BT 1000ML

## (undated) DEVICE — INFLATION DEVICE: Brand: ENCORE™ 26

## (undated) DEVICE — KT VLV HEMO MAP ACC PLS LG/BORE MTL/INTRO

## (undated) DEVICE — HI-TORQUE WHISPER ES GUIDE WIRE .014 STRAIGHTTIP 3.0 CM X 190 CM: Brand: HI-TORQUE WHISPER

## (undated) DEVICE — PAD, DEFIB, ADULT, RADIOTRANS, PHYSIO: Brand: MEDLINE

## (undated) DEVICE — BALN IAB SENSATION PLS F/O 40CC 7.5F 15CM SYS

## (undated) DEVICE — INF TL MULIPACK FR6: Brand: INFINITI

## (undated) DEVICE — CANN NASL ETCO2 LO/FLO A/

## (undated) DEVICE — Device: Brand: MEDEX

## (undated) DEVICE — IMMOB KN 3PNL DLX CANVS 19IN BLU

## (undated) DEVICE — SOLIDIFIER LIQUI LOC PLUS 2000CC

## (undated) DEVICE — Device

## (undated) DEVICE — DEV TORQ GW HOT/PINK

## (undated) DEVICE — PINNACLE INTRODUCER SHEATH: Brand: PINNACLE

## (undated) DEVICE — CATH F6 ST+ MP A1 100CM: Brand: SUPER TORQUE

## (undated) DEVICE — Device: Brand: FIELDER XT

## (undated) DEVICE — PK CATH CARD 30 CA/4